# Patient Record
Sex: FEMALE | Race: WHITE | Employment: OTHER | URBAN - METROPOLITAN AREA
[De-identification: names, ages, dates, MRNs, and addresses within clinical notes are randomized per-mention and may not be internally consistent; named-entity substitution may affect disease eponyms.]

---

## 2017-02-01 ENCOUNTER — OFFICE VISIT (OUTPATIENT)
Dept: FAMILY MEDICINE CLINIC | Age: 67
End: 2017-02-01

## 2017-02-01 VITALS
BODY MASS INDEX: 29.09 KG/M2 | HEART RATE: 82 BPM | WEIGHT: 180.25 LBS | OXYGEN SATURATION: 97 % | SYSTOLIC BLOOD PRESSURE: 110 MMHG | RESPIRATION RATE: 12 BRPM | DIASTOLIC BLOOD PRESSURE: 70 MMHG

## 2017-02-01 DIAGNOSIS — J30.9 ALLERGIC RHINITIS, UNSPECIFIED ALLERGIC RHINITIS TRIGGER, UNSPECIFIED RHINITIS SEASONALITY: ICD-10-CM

## 2017-02-01 DIAGNOSIS — M76.61 ACHILLES TENDINITIS OF RIGHT LOWER EXTREMITY: ICD-10-CM

## 2017-02-01 DIAGNOSIS — K21.9 GASTROESOPHAGEAL REFLUX DISEASE WITHOUT ESOPHAGITIS: ICD-10-CM

## 2017-02-01 DIAGNOSIS — I83.893 VARICOSE VEINS OF BOTH LEGS WITH EDEMA: ICD-10-CM

## 2017-02-01 DIAGNOSIS — F41.9 ANXIETY: Primary | ICD-10-CM

## 2017-02-01 DIAGNOSIS — Z23 NEED FOR PNEUMOCOCCAL VACCINATION: ICD-10-CM

## 2017-02-01 PROCEDURE — 99214 OFFICE O/P EST MOD 30 MIN: CPT | Performed by: FAMILY MEDICINE

## 2017-02-01 PROCEDURE — 90732 PPSV23 VACC 2 YRS+ SUBQ/IM: CPT | Performed by: FAMILY MEDICINE

## 2017-02-01 PROCEDURE — G0009 ADMIN PNEUMOCOCCAL VACCINE: HCPCS | Performed by: FAMILY MEDICINE

## 2017-02-01 RX ORDER — OMEPRAZOLE 40 MG/1
CAPSULE, DELAYED RELEASE ORAL
Qty: 90 CAPSULE | Refills: 3 | Status: SHIPPED | OUTPATIENT
Start: 2017-02-01 | End: 2018-02-17 | Stop reason: SDUPTHER

## 2017-02-01 RX ORDER — FLUTICASONE PROPIONATE 50 MCG
2 SPRAY, SUSPENSION (ML) NASAL DAILY
Qty: 1 BOTTLE | Refills: 11 | Status: SHIPPED | OUTPATIENT
Start: 2017-02-01 | End: 2022-04-20 | Stop reason: SDUPTHER

## 2017-11-15 PROBLEM — R07.9 CHEST PAIN: Status: ACTIVE | Noted: 2017-11-15

## 2017-11-15 PROBLEM — R10.11 RUQ PAIN: Status: ACTIVE | Noted: 2017-11-15

## 2017-12-07 ENCOUNTER — HOSPITAL ENCOUNTER (OUTPATIENT)
Dept: WOMENS IMAGING | Age: 67
Discharge: HOME | End: 2017-12-07
Attending: SURGERY | Admitting: SURGERY

## 2017-12-07 DIAGNOSIS — Z12.31 VISIT FOR SCREENING MAMMOGRAM: ICD-10-CM

## 2018-02-17 DIAGNOSIS — K21.9 GASTROESOPHAGEAL REFLUX DISEASE WITHOUT ESOPHAGITIS: ICD-10-CM

## 2018-02-19 RX ORDER — OMEPRAZOLE 40 MG/1
CAPSULE, DELAYED RELEASE ORAL
Qty: 30 CAPSULE | Refills: 0 | Status: SHIPPED | OUTPATIENT
Start: 2018-02-19 | End: 2019-09-25 | Stop reason: SDUPTHER

## 2018-12-13 ENCOUNTER — HOSPITAL ENCOUNTER (OUTPATIENT)
Dept: WOMENS IMAGING | Age: 68
Discharge: HOME | End: 2018-12-13
Payer: MEDICARE

## 2018-12-13 ENCOUNTER — HOSPITAL ENCOUNTER (OUTPATIENT)
Dept: ULTRASOUND IMAGING | Age: 68
Discharge: HOME | End: 2018-12-13
Payer: MEDICARE

## 2018-12-13 DIAGNOSIS — Z12.31 VISIT FOR SCREENING MAMMOGRAM: ICD-10-CM

## 2018-12-13 PROCEDURE — 77067 SCR MAMMO BI INCL CAD: CPT

## 2018-12-13 PROCEDURE — 76642 ULTRASOUND BREAST LIMITED: CPT

## 2019-09-25 ENCOUNTER — OFFICE VISIT (OUTPATIENT)
Dept: FAMILY MEDICINE CLINIC | Age: 69
End: 2019-09-25
Payer: MEDICARE

## 2019-09-25 VITALS
TEMPERATURE: 97.9 F | DIASTOLIC BLOOD PRESSURE: 62 MMHG | WEIGHT: 185 LBS | SYSTOLIC BLOOD PRESSURE: 102 MMHG | OXYGEN SATURATION: 97 % | BODY MASS INDEX: 29.86 KG/M2 | HEART RATE: 72 BPM

## 2019-09-25 DIAGNOSIS — J01.90 ACUTE BACTERIAL SINUSITIS: Primary | ICD-10-CM

## 2019-09-25 DIAGNOSIS — K21.9 GASTROESOPHAGEAL REFLUX DISEASE WITHOUT ESOPHAGITIS: ICD-10-CM

## 2019-09-25 DIAGNOSIS — B96.89 ACUTE BACTERIAL SINUSITIS: Primary | ICD-10-CM

## 2019-09-25 PROCEDURE — 99213 OFFICE O/P EST LOW 20 MIN: CPT | Performed by: FAMILY MEDICINE

## 2019-09-25 RX ORDER — OMEPRAZOLE 40 MG/1
CAPSULE, DELAYED RELEASE ORAL
Qty: 90 CAPSULE | Refills: 1 | Status: SHIPPED | OUTPATIENT
Start: 2019-09-25 | End: 2020-05-01

## 2019-09-25 RX ORDER — AZITHROMYCIN 500 MG/1
500 TABLET, FILM COATED ORAL DAILY
Qty: 5 TABLET | Refills: 0 | Status: SHIPPED | OUTPATIENT
Start: 2019-09-25 | End: 2019-09-30

## 2019-09-25 ASSESSMENT — ENCOUNTER SYMPTOMS
WHEEZING: 1
SORE THROAT: 1
SINUS PAIN: 1
RHINORRHEA: 1
COUGH: 1

## 2019-09-25 NOTE — PROGRESS NOTES
Subjective:      Patient ID: Ramiro Mccray is a 68 y.o. female. CC: Patient presents for acute medical problem-persistent respiratory infection request a refill of omeprazole. . Medical assistant notes reviewed. URI    This is a recurrent problem. Episode onset: for past 2 days. There has been no fever. Associated symptoms include congestion, coughing, headaches, rhinorrhea, sinus pain, a sore throat and wheezing. Associated symptoms comments: Post nasal drip, brown-yellow mucous, sinus pressure. Treatments tried: Bromfed DM, salt water, Chloraseptic spray. Patient was seen by Urgent Care in [place] and was given Augmentin and Bromfed DM for strep throat and sinusitis. Patient states she finished the antibiotic and her symptoms came back. Patient completed antibiotic therapy 5 days ago and still having symptoms. All the symptoms started in the last 2 weeks. Patient also has known GERD typically takes PPI medications. She does request a refill of omeprazole. Review of Systems   HENT: Positive for congestion, rhinorrhea, sinus pain and sore throat. Respiratory: Positive for cough and wheezing. Neurological: Positive for headaches. Allergies   Allergen Reactions    Other      INH, for tb inhalation       Objective:   Physical Exam   Constitutional: She appears well-developed and well-nourished. She is cooperative. No distress. HENT:   Right Ear: Tympanic membrane and ear canal normal.   Left Ear: Tympanic membrane and ear canal normal.   Nose: Mucosal edema and rhinorrhea (purulent) present. Right sinus exhibits frontal sinus tenderness. Right sinus exhibits no maxillary sinus tenderness. Left sinus exhibits frontal sinus tenderness. Left sinus exhibits no maxillary sinus tenderness. Mouth/Throat: Oropharynx is clear and moist and mucous membranes are normal.   Pulmonary/Chest: Effort normal and breath sounds normal.   Lymphadenopathy:     She has cervical adenopathy.    Neurological: She

## 2019-12-19 ENCOUNTER — HOSPITAL ENCOUNTER (OUTPATIENT)
Dept: WOMENS IMAGING | Age: 69
Discharge: HOME | End: 2019-12-19
Payer: MEDICARE

## 2019-12-19 DIAGNOSIS — Z12.39 BREAST CANCER SCREENING: ICD-10-CM

## 2019-12-19 PROCEDURE — 77067 SCR MAMMO BI INCL CAD: CPT

## 2020-05-01 RX ORDER — OMEPRAZOLE 40 MG/1
CAPSULE, DELAYED RELEASE ORAL
Qty: 90 CAPSULE | Refills: 0 | Status: SHIPPED | OUTPATIENT
Start: 2020-05-01 | End: 2020-08-05

## 2020-08-05 RX ORDER — OMEPRAZOLE 40 MG/1
CAPSULE, DELAYED RELEASE ORAL
Qty: 30 CAPSULE | Refills: 0 | Status: SHIPPED | OUTPATIENT
Start: 2020-08-05 | End: 2020-09-04

## 2020-10-07 RX ORDER — OMEPRAZOLE 40 MG/1
CAPSULE, DELAYED RELEASE ORAL
Qty: 30 CAPSULE | Refills: 0 | Status: SHIPPED | OUTPATIENT
Start: 2020-10-07 | End: 2020-11-09

## 2020-10-07 NOTE — TELEPHONE ENCOUNTER
Medication:   Requested Prescriptions     Pending Prescriptions Disp Refills    omeprazole (PRILOSEC) 40 MG delayed release capsule [Pharmacy Med Name: OMEPRAZOLE DR 40 MG CAPSULE] 30 capsule      Sig: TAKE ONE CAPSULE BY MOUTH DAILY          Patient Phone Number: [phone] (home)     Last appt: 9/25/2019   Next appt: Visit date not found    Last OARRS: No flowsheet data found.   PDMP Monitoring:    Last PDMP [facility] as Reviewed [facility]):  Review User Review Instant Review Result          [facility]:   [facility] [address] [facility] [phone] Jaqueline Hurd [phone] [address] [address]  Phone: [phone] Fax: [phone]

## 2020-11-09 RX ORDER — OMEPRAZOLE 40 MG/1
CAPSULE, DELAYED RELEASE ORAL
Qty: 30 CAPSULE | Refills: 0 | Status: SHIPPED | OUTPATIENT
Start: 2020-11-09 | End: 2022-03-22 | Stop reason: SDUPTHER

## 2020-11-09 NOTE — TELEPHONE ENCOUNTER
Medication:   Requested Prescriptions     Pending Prescriptions Disp Refills    omeprazole (PRILOSEC) 40 MG delayed release capsule [Pharmacy Med Name: OMEPRAZOLE DR 40 MG CAPSULE] 30 capsule 0     Sig: TAKE ONE CAPSULE BY MOUTH DAILY          Patient Phone Number: [phone] (home)     Last appt: 9/25/2019   Next appt: Visit date not found    Last OARRS: No flowsheet data found.   PDMP Monitoring:    Last PDMP Ellis Salcedo as Reviewed [facility]):  Review User Review Instant Review Result          Preferred Pharmacy:   [address] [place] [address] [address], [address] [facility] [phone] [facility] [phone] [address] [facility] [address]  Phone: [phone] Fax: [phone]

## 2020-12-09 RX ORDER — OMEPRAZOLE 40 MG/1
CAPSULE, DELAYED RELEASE ORAL
Qty: 30 CAPSULE | Refills: 0 | OUTPATIENT
Start: 2020-12-09

## 2021-01-07 ENCOUNTER — HOSPITAL ENCOUNTER (OUTPATIENT)
Dept: WOMENS IMAGING | Age: 71
Discharge: HOME | End: 2021-01-07
Payer: MEDICARE

## 2021-01-07 DIAGNOSIS — Z12.31 VISIT FOR SCREENING MAMMOGRAM: ICD-10-CM

## 2021-01-07 PROCEDURE — 77067 SCR MAMMO BI INCL CAD: CPT

## 2021-03-05 ENCOUNTER — APPOINTMENT (OUTPATIENT)
Dept: GENERAL RADIOLOGY | Age: 71
End: 2021-03-05
Payer: MEDICARE

## 2021-03-05 ENCOUNTER — HOSPITAL ENCOUNTER (EMERGENCY)
Age: 71
Discharge: HOME | End: 2021-03-05
Payer: MEDICARE

## 2021-03-05 ENCOUNTER — TELEPHONE (OUTPATIENT)
Dept: FAMILY MEDICINE CLINIC | Age: 71
End: 2021-03-05

## 2021-03-05 VITALS
RESPIRATION RATE: 20 BRPM | SYSTOLIC BLOOD PRESSURE: 145 MMHG | DIASTOLIC BLOOD PRESSURE: 86 MMHG | TEMPERATURE: 97.8 F | BODY MASS INDEX: 30.34 KG/M2 | WEIGHT: 188 LBS | OXYGEN SATURATION: 99 % | HEART RATE: 75 BPM

## 2021-03-05 DIAGNOSIS — M25.512 ACUTE PAIN OF LEFT SHOULDER DUE TO TRAUMA: ICD-10-CM

## 2021-03-05 DIAGNOSIS — S01.511A LIP LACERATION, INITIAL ENCOUNTER: ICD-10-CM

## 2021-03-05 DIAGNOSIS — S09.93XA FACIAL TRAUMA, INITIAL ENCOUNTER: Primary | ICD-10-CM

## 2021-03-05 DIAGNOSIS — G89.11 ACUTE PAIN OF LEFT SHOULDER DUE TO TRAUMA: ICD-10-CM

## 2021-03-05 PROCEDURE — 6360000002 HC RX W HCPCS: Performed by: PHYSICIAN ASSISTANT

## 2021-03-05 PROCEDURE — 12013 RPR F/E/E/N/L/M 2.6-5.0 CM: CPT

## 2021-03-05 PROCEDURE — 90471 IMMUNIZATION ADMIN: CPT | Performed by: PHYSICIAN ASSISTANT

## 2021-03-05 PROCEDURE — 73060 X-RAY EXAM OF HUMERUS: CPT

## 2021-03-05 PROCEDURE — 99283 EMERGENCY DEPT VISIT LOW MDM: CPT

## 2021-03-05 PROCEDURE — 6370000000 HC RX 637 (ALT 250 FOR IP): Performed by: PHYSICIAN ASSISTANT

## 2021-03-05 PROCEDURE — 96372 THER/PROPH/DIAG INJ SC/IM: CPT

## 2021-03-05 PROCEDURE — 90715 TDAP VACCINE 7 YRS/> IM: CPT | Performed by: PHYSICIAN ASSISTANT

## 2021-03-05 RX ORDER — AMOXICILLIN 500 MG/1
500 CAPSULE ORAL 3 TIMES DAILY
Qty: 30 CAPSULE | Refills: 0 | Status: SHIPPED | OUTPATIENT
Start: 2021-03-05 | End: 2021-03-15

## 2021-03-05 RX ORDER — NAPROXEN 500 MG/1
500 TABLET ORAL 2 TIMES DAILY PRN
Qty: 20 TABLET | Refills: 0 | Status: SHIPPED | OUTPATIENT
Start: 2021-03-05 | End: 2021-03-12 | Stop reason: SDUPTHER

## 2021-03-05 RX ORDER — NAPROXEN 250 MG/1
500 TABLET ORAL ONCE
Status: COMPLETED | OUTPATIENT
Start: 2021-03-05 | End: 2021-03-05

## 2021-03-05 RX ADMIN — TETANUS TOXOID, REDUCED DIPHTHERIA TOXOID AND ACELLULAR PERTUSSIS VACCINE, ADSORBED 0.5 ML: 5; 2.5; 8; 8; 2.5 SUSPENSION INTRAMUSCULAR at 09:17

## 2021-03-05 RX ADMIN — NAPROXEN 500 MG: 250 TABLET ORAL at 09:18

## 2021-03-05 ASSESSMENT — ENCOUNTER SYMPTOMS
ABDOMINAL PAIN: 0
SHORTNESS OF BREATH: 0
NAUSEA: 0
CHEST TIGHTNESS: 0
DIARRHEA: 0
VOMITING: 0

## 2021-03-05 ASSESSMENT — PAIN SCALES - GENERAL: PAINLEVEL_OUTOF10: 8

## 2021-03-05 NOTE — TELEPHONE ENCOUNTER
----- Message from Wallace Greenwood sent at 3/5/2021 10:07 AM EST -----  Subject: Appointment Request    Reason for Call: Routine ED Follow Up Visit    QUESTIONS  Type of Appointment? Established Patient  Reason for appointment request? Available appointments did not meet   patient need  Additional Information for Provider? Patient needs an appointment to come   in and have stitches in her lip removed on tuesday according to discharge   instructions from [facility]. No appointments available on   tues. Please call patient to schedule with dr. Alyssia Larios or partner. ---------------------------------------------------------------------------  --------------  Cristobal GARCIA  What is the best way for the office to contact you? OK to leave message on   voicemail  Preferred Call Back Phone Number? [phone]  ---------------------------------------------------------------------------  --------------  SCRIPT ANSWERS  Relationship to Patient? Other  Representative Name? Gomez Garcia  Additional information verified (besides Name and Date of Birth)? Address  Appointment reason? Well Care/Follow Ups  Select a Well Care/Follow Ups appointment reason? Adult ED Follow Up   [Emergency Room   Emergency Department]  (Patient requests to see provider urgently. )? No  Do you have any questions for your primary care provider that need to be   answered prior to your appointment? No  Have you been diagnosed with   tested for   or told that you are suspected of having COVID-19 (Coronavirus)? No  Have you had a fever or taken medication to treat a fever within the past   3 days? No  Have you had a cough   shortness of breath or flu-like symptoms within the past 3 days? No  Do you currently have flu-like symptoms including fever or chills   cough   shortness of breath   or difficulty breathing   or new loss of taste or smell? No  (Service Expert  click yes below to proceed with [employer] As Usual   Scheduling)?  Yes

## 2021-03-05 NOTE — ED PROVIDER NOTES
[address] [facility]        Pt Name: Dipesh Mobley  MRN: [id number]  [place] 1950  Date of evaluation: 3/5/2021  Provider: Collin Riggs PA-C  PCP: Uli Klein MD    MIGUEL. I have evaluated this patient. My supervising physician was available for consultation. CHIEF COMPLAINT       Chief Complaint   Patient presents with    Lip Laceration     Pt reports hitting face on fence d/t dog pulling her. Denies LOC. laceration to bottom lip noted. Bleeding controlled. No blood thinner       HISTORY OF PRESENT ILLNESS   (Location, Timing/Onset, Context/Setting, Quality, Duration, Modifying Factors, Severity, Associated Signs and Symptoms)  Note limiting factors. Dipesh Mobley is a 65 y.o. female presents the emergency department after a trauma that occurred this morning. Patient reports that her dog had gotten away from her. She was running towards her when she accidentally hit the left lateral border of her face as well as left arm on the fence. Patient did not blackout or lose consciousness. Unfortunately she did sustain a laceration underneath the left lower lip. Patient states she is also having pain and discomfort in and around the region of her left humerus which is the shoulder that she had previously had a rotator cuff repair. She states she is done nothing yet for the above-mentioned. She is not currently up-to-date with tetanus vaccination. Current level of pain and discomfort over her lip is 8 out of 10 in her arm is 2 out of 10 and with this she presents to the ED for evaluation and treatment with no additional complaints or concerns. Nursing Notes were all reviewed and agreed with or any disagreements were addressed in the HPI. REVIEW OF SYSTEMS    (2-9 systems for level 4, 10 or more for level 5)     Review of Systems   Constitutional: Negative for activity change, chills and fever.    Respiratory: Negative for chest tightness and shortness of breath. Cardiovascular: Negative for chest pain. Gastrointestinal: Negative for abdominal pain, diarrhea, nausea and vomiting. Genitourinary: Negative for dysuria and flank pain. Musculoskeletal: Positive for arthralgias and myalgias. Skin: Positive for wound. Neurological: Negative for numbness and headaches. Positives and Pertinent negatives as per HPI. Except as noted above in the ROS, all other systems were reviewed and negative. PAST MEDICAL HISTORY     Past Medical History:   Diagnosis Date    Benign neoplasm of colon     GERD (gastroesophageal reflux disease)     Osteoporosis          SURGICAL HISTORY     Past Surgical History:   Procedure Laterality Date    SHOULDER SURGERY      SINUS SURGERY      TONSILLECTOMY           CURRENTMEDICATIONS       Previous Medications    FLUTICASONE (FLONASE) 50 MCG/ACT NASAL SPRAY    2 sprays by Nasal route daily    OMEPRAZOLE (PRILOSEC) 40 MG DELAYED RELEASE CAPSULE    TAKE ONE CAPSULE BY MOUTH DAILY    SUMATRIPTAN (IMITREX) 50 MG TABLET    Take 1 tablet by mouth once as needed for Migraine         ALLERGIES     Other    FAMILYHISTORY       Family History   Problem Relation Age of Onset    Heart Disease Mother         heart rhythm    Heart Disease Father         heart rhythm    COPD Father     Cancer Maternal Grandmother         leukemia          SOCIAL HISTORY       Social History     Tobacco Use    Smoking status: Never Smoker    Smokeless tobacco: Never Used   Substance Use Topics    Alcohol use: No     Alcohol/week: 0.0 standard drinks    Drug use: No       SCREENINGS             PHYSICAL EXAM    (up to 7 for level 4, 8 or more for level 5)     ED Triage Vitals [03/05/21 0829]   BP Temp Temp Source Pulse Resp SpO2 Height Weight   (!) 145/86 97.8 °F (36.6 °C) Temporal 75 20 99 % -- 188 lb (85.3 kg)       Physical Exam  Vitals signs and nursing note reviewed.    Constitutional:       General: She is awake. She is not in acute distress. Appearance: Normal appearance. She is well-developed. She is not ill-appearing or diaphoretic. HENT:      Head: Normocephalic. Laceration present. No raccoon eyes or Lindsey's sign. Jaw: There is normal jaw occlusion. Right Ear: Hearing, tympanic membrane, ear canal and external ear normal. No hemotympanum. Left Ear: Hearing, tympanic membrane, ear canal and external ear normal. No hemotympanum. Nose: Nose normal.      Mouth/Throat:      Lips: Pink. Mouth: Mucous membranes are moist. Injury and lacerations present. Dentition: Dental tenderness present. Pharynx: Oropharynx is clear. Comments: Dentition intact. Small 0.5 cm buckle sided laceration over left incisor. Eyes:      General: No scleral icterus. Right eye: No discharge. Left eye: No discharge. Conjunctiva/sclera: Conjunctivae normal.   Neck:      Musculoskeletal: Normal range of motion. Vascular: No JVD. Cardiovascular:      Rate and Rhythm: Normal rate and regular rhythm. Heart sounds: No murmur. No friction rub. No gallop. Pulmonary:      Effort: Pulmonary effort is normal. No accessory muscle usage or respiratory distress. Breath sounds: Normal breath sounds. No wheezing, rhonchi or rales. Abdominal:      General: There is no distension. Palpations: Abdomen is soft. Abdomen is not rigid. There is no mass. Tenderness: There is no abdominal tenderness. There is no guarding or rebound. Musculoskeletal:      Left upper arm: She exhibits tenderness and bony tenderness. She exhibits no swelling, no edema, no deformity and no laceration. Arms:       Comments: Good range of motion although pain inhibited for the glenohumeral joint. Proximal humerus or tenderness to palpation. Elbow hand and wrist unremarkable. Neurovascular integrity intact. Skin:     General: Skin is warm and dry.    Neurological:      Mental Status: She is alert and oriented to person, place, and time. GCS: GCS eye subscore is 4. GCS verbal subscore is 5. GCS motor subscore is 6. Cranial Nerves: No cranial nerve deficit. Sensory: No sensory deficit. Coordination: Coordination normal.   Psychiatric:         Behavior: Behavior normal. Behavior is cooperative. DIAGNOSTIC RESULTS   LABS:    Labs Reviewed - No data to display    All other labs were within normal range or not returned as of this dictation. EKG: All EKG's are interpreted by the Emergency Department Physician in the absence of a cardiologist.  Please see their note for interpretation of EKG. RADIOLOGY:   Non-plain film images such as CT, Ultrasound and MRI are read by the radiologist. Plain radiographic images are visualized and preliminarily interpreted by the ED Provider with the below findings:        Interpretation per the Radiologist below, if available at the time of this note:    XR HUMERUS LEFT (MIN 2 VIEWS)   Final Result   Negative radiographs of the left humerus. Xr Humerus Left (min 2 Views)    Result Date: 3/5/2021  EXAMINATION: TWO XRAY VIEWS OF THE LEFT HUMERUS 3/5/2021 8:44 am COMPARISON: None. HISTORY: ORDERING SYSTEM PROVIDED HISTORY: injury and pain TECHNOLOGIST PROVIDED HISTORY: Reason for exam:->injury and pain Reason for Exam: Lip Laceration (Pt reports hitting face on fence d/t dog pulling her. Denies LOC. laceration to bottom lip noted. Bleeding controlled. No blood thinner). Left shoulder pain. Acuity: Unknown Type of Exam: Unknown FINDINGS: No fracture of the humerus or the adjacent osseous structures. No dislocation     Negative radiographs of the left humerus.            PROCEDURES   Unless otherwise noted below, none     Lac Repair    Date/Time: 3/5/2021 9:19 AM  Performed by: Doug Coughlin PA-C  Authorized by: Doug Coughlin PA-C     Consent:     Consent obtained:  Verbal  Laceration details:     Location:  Face Face location:  Chin    Length (cm):  3  Repair type:     Repair type:  Simple  Treatment:     Area cleansed with:  Hibiclens    Amount of cleaning:  Standard  Skin repair:     Repair method:  Sutures    Suture size:  6-0    Suture material:  Nylon    Suture technique:  Simple interrupted    Number of sutures:  4  Approximation:     Approximation:  Close  Post-procedure details:     Dressing:  Open (no dressing)    Patient tolerance of procedure: Tolerated well, no immediate complications        CRITICAL CARE TIME   N/A    CONSULTS:  None      EMERGENCY DEPARTMENT COURSE and DIFFERENTIAL DIAGNOSIS/MDM:   Vitals:    Vitals:    03/05/21 0829   BP: (!) 145/86   Pulse: 75   Resp: 20   Temp: 97.8 °F (36.6 °C)   TempSrc: Temporal   SpO2: 99%   Weight: 188 lb (85.3 kg)       Patient was given the following medications:  Medications   Tetanus-Diphth-Acell Pertussis (BOOSTRIX) injection 0.5 mL (0.5 mLs Intramuscular Given 3/5/21 9695)   naproxen (NAPROSYN) tablet 500 mg (500 mg Oral Given 3/5/21 7680)         The patient's detailed history of present illness is documented as above. Upon arrival to the emergency department the patient's vital signs are as documented. The patient is noted to be hemodynamically stable and afebrile. Physical examination findings are as above. Radiographs of the patient's left shoulder demonstrate no evidence of acute bony abnormality. Tetanus updated medication dispensed. Laceration repair completed as above. Lengthy discussion was had with the patient in regards the above-mentioned. There is a small buckle sided laceration as well soft tissue swelling. Medications for the home environment follow-up with her primary care physician for suture removal as well as dentist. The patient has been made aware of the signs and symptoms which would necessitate an immediate return to the emergency department and verbalizes an understanding of these signs and symptoms.     I estimate there is low risk for intracranial hemorrhage or edema, subdural or epidural hematoma, cauda equina or central cord syndrome, cord compression, compartment syndrome, tendon or neurovascular injury, pneumothorax, hemothorax, pericardial tamponade, cardiac contusion, thoracic aortic dissection, intra-abdominal injury or perforated bowel, thus I consider the discharge disposition reasonable. I estimate there is low risk for compartment syndrome, deep venous thrombosis, limb-threatening infectious, tendon and/or neurovascular injury and therefore I consider the discharge disposition reasonable. Huy Fung and I have discussed the diagnosis and risks, and we agree with discharging home to follow-up with their primary care provider and/or the referring orthopedist on call. We also discussed returning to the emergency department immediately if new or worsening symptoms occur. We have discussed the symptoms which are most concerning (e.g., changing or worsening pain, numbness, weakness) that necessitate immediate return. FINAL IMPRESSION      1. Facial trauma, initial encounter    2. Lip laceration, initial encounter    3. Acute pain of left shoulder due to trauma          DISPOSITION/PLAN   DISPOSITION Decision To Discharge 03/05/2021 09:17:04 AM      PATIENT REFERREDTO:  Nguyễn Barnett MD  [facility]  [phone]    In 1 week  For suture removal      Make an appointment with your dentist as we discussed.         [facility] Emergency Department  [address] [facility]  [phone]    If symptoms worsen      DISCHARGE MEDICATIONS:  New Prescriptions    AMOXICILLIN (AMOXIL) 500 MG CAPSULE    Take 1 capsule by mouth 3 times daily for 10 days    NAPROXEN (NAPROSYN) 500 MG TABLET    Take 1 tablet by mouth 2 times daily as needed for Pain       DISCONTINUED MEDICATIONS:  Discontinued Medications    No medications on file              (Please note that portions of this note were completed with a voice recognition program.  Efforts were made to edit the dictations but occasionally words are mis-transcribed.)    Nina Loaiza PA-C (electronically signed)           Verenice Brand PA-C  03/05/21 9834

## 2021-03-12 ENCOUNTER — OFFICE VISIT (OUTPATIENT)
Dept: FAMILY MEDICINE CLINIC | Age: 71
End: 2021-03-12
Payer: MEDICARE

## 2021-03-12 VITALS
OXYGEN SATURATION: 96 % | HEART RATE: 72 BPM | BODY MASS INDEX: 30.34 KG/M2 | TEMPERATURE: 97.8 F | WEIGHT: 188 LBS | DIASTOLIC BLOOD PRESSURE: 66 MMHG | SYSTOLIC BLOOD PRESSURE: 120 MMHG

## 2021-03-12 DIAGNOSIS — M70.52 PES ANSERINUS BURSITIS OF LEFT KNEE: ICD-10-CM

## 2021-03-12 DIAGNOSIS — S46.912A STRAIN OF LEFT SHOULDER, INITIAL ENCOUNTER: ICD-10-CM

## 2021-03-12 DIAGNOSIS — S01.81XA FACIAL LACERATION, INITIAL ENCOUNTER: Primary | ICD-10-CM

## 2021-03-12 PROCEDURE — 99214 OFFICE O/P EST MOD 30 MIN: CPT | Performed by: FAMILY MEDICINE

## 2021-03-12 RX ORDER — NAPROXEN 500 MG/1
500 TABLET ORAL 2 TIMES DAILY PRN
Qty: 20 TABLET | Refills: 0 | Status: SHIPPED | OUTPATIENT
Start: 2021-03-12 | End: 2022-03-22 | Stop reason: SDUPTHER

## 2021-03-12 NOTE — PROGRESS NOTES
Subjective:      Patient ID: Hany Maurer is a 66 y.o. female. CC: Patient presents emergency room follow-up    HPI Patient presents for a follow-up from [facility] ED on 3/5/2021. Patient had a laceration to her face after her dog pulled her away while walking the dog and she hit her face on the fence. Patient has stitches on face underneath her lip. Patient had a through and through laceration. The skin was obviously closed with the buccal mucosa was not closed. She has been eating a light diet and today's first day she feels that she can eat more. She is also having tenderness in the left shoulder. She did have a shoulder x-ray performed in the emergency room which was normal.  She has discomfort in the anterior portion of the left shoulder. Worse with rotation. She also has discomfort on the inner aspect of the knee.   She has been using some ice to these areas and taking Naprosyn intermittently    Review of Systems     Patient Active Problem List   Diagnosis    GERD (gastroesophageal reflux disease)    Osteoporosis    Varicose veins of both legs with edema    Meniscus, medial, derangement    Chest pain    RUQ pain       Outpatient Medications Marked as Taking for the 3/12/21 encounter (Office Visit) with Kalani Cm MD   Medication Sig Dispense Refill    amoxicillin (AMOXIL) 500 MG capsule Take 1 capsule by mouth 3 times daily for 10 days 30 capsule 0    naproxen (NAPROSYN) 500 MG tablet Take 1 tablet by mouth 2 times daily as needed for Pain 20 tablet 0    omeprazole (PRILOSEC) 40 MG delayed release capsule TAKE ONE CAPSULE BY MOUTH DAILY 30 capsule 0    fluticasone (FLONASE) 50 MCG/ACT nasal spray 2 sprays by Nasal route daily 1 Bottle 11       Allergies   Allergen Reactions    Other      INH, for tb inhalation       Social History     Tobacco Use    Smoking status: Never Smoker    Smokeless tobacco: Never Used   Substance Use Topics    Alcohol use: No     Alcohol/week: 0.0 standard drinks       /66 (Site: Left Upper Arm, Position: Sitting, Cuff Size: Medium Adult)   Pulse 72   Temp 97.8 °F (36.6 °C)   Wt 188 lb (85.3 kg)   SpO2 96%   BMI 30.34 kg/m²       Objective:   Physical Exam  Vitals signs and nursing note reviewed. Constitutional:       General: She is not in acute distress. Appearance: She is well-developed. Musculoskeletal:      Right shoulder: Normal.      Left shoulder: She exhibits decreased range of motion (Secondary to pain. Flexion extension is full but abduction is only to 60 degrees and internal and external rotation is limited) and tenderness (Rotator cuff insertion site and deltoid insertion site). She exhibits no swelling and no crepitus. Right knee: Normal.      Left knee: She exhibits normal range of motion, no LCL laxity, normal patellar mobility and no MCL laxity. Tenderness (Pes bursa) found. No medial joint line, no lateral joint line, no MCL, no LCL and no patellar tendon tenderness noted. Skin:     General: Skin is warm. Findings: Laceration present. No ecchymosis. Comments: Well-healing laceration of the face just below the outer left lower lip   Neurological:      Mental Status: She is alert. Psychiatric:         Behavior: Behavior is cooperative. Assessment:      James was seen today for follow-up from hospital.    Diagnoses and all orders for this visit:    Facial laceration, initial encounter    Strain of left shoulder, initial encounter    Pes anserinus bursitis of left knee    Other orders  -     naproxen (NAPROSYN) 500 MG tablet; Take 1 tablet by mouth 2 times daily as needed for Pain            Plan:      Sutures were removed from the lip laceration. Recommended that she continue to watch for infections and may resume diet as she tolerates. For the shoulder strain and pes bursitis I recommend she restart Naprosyn twice a day regularly for the next 2 weeks.   Activities as tolerated    RTC as

## 2021-11-17 ENCOUNTER — TELEPHONE (OUTPATIENT)
Dept: FAMILY MEDICINE CLINIC | Age: 71
End: 2021-11-17

## 2021-11-17 NOTE — TELEPHONE ENCOUNTER
Patient needs a referral for her Right big toe nail is dead and needs to be removed.     Please advise

## 2022-02-10 ENCOUNTER — HOSPITAL ENCOUNTER (OUTPATIENT)
Dept: WOMENS IMAGING | Age: 72
Discharge: HOME | End: 2022-02-10
Payer: MEDICARE

## 2022-02-10 DIAGNOSIS — Z12.31 BREAST CANCER SCREENING BY MAMMOGRAM: ICD-10-CM

## 2022-02-10 PROCEDURE — 77063 BREAST TOMOSYNTHESIS BI: CPT

## 2022-03-22 ENCOUNTER — OFFICE VISIT (OUTPATIENT)
Dept: FAMILY MEDICINE CLINIC | Age: 72
End: 2022-03-22
Payer: MEDICARE

## 2022-03-22 VITALS
BODY MASS INDEX: 30.34 KG/M2 | OXYGEN SATURATION: 96 % | DIASTOLIC BLOOD PRESSURE: 80 MMHG | TEMPERATURE: 97.3 F | WEIGHT: 188 LBS | HEART RATE: 81 BPM | SYSTOLIC BLOOD PRESSURE: 110 MMHG

## 2022-03-22 DIAGNOSIS — Z00.00 WELL ADULT EXAM: ICD-10-CM

## 2022-03-22 DIAGNOSIS — Z13.6 SCREENING FOR CARDIOVASCULAR CONDITION: ICD-10-CM

## 2022-03-22 DIAGNOSIS — K21.9 GASTROESOPHAGEAL REFLUX DISEASE WITHOUT ESOPHAGITIS: ICD-10-CM

## 2022-03-22 DIAGNOSIS — R73.9 HYPERGLYCEMIA: ICD-10-CM

## 2022-03-22 DIAGNOSIS — I80.01 SUPERFICIAL PHLEBITIS OF LEG, RIGHT: Primary | ICD-10-CM

## 2022-03-22 PROCEDURE — 99213 OFFICE O/P EST LOW 20 MIN: CPT | Performed by: FAMILY MEDICINE

## 2022-03-22 RX ORDER — NAPROXEN 500 MG/1
500 TABLET ORAL 2 TIMES DAILY PRN
Qty: 20 TABLET | Refills: 0 | Status: SHIPPED | OUTPATIENT
Start: 2022-03-22 | End: 2022-04-20

## 2022-03-22 RX ORDER — OMEPRAZOLE 40 MG/1
CAPSULE, DELAYED RELEASE ORAL
Qty: 30 CAPSULE | Refills: 0 | Status: SHIPPED | OUTPATIENT
Start: 2022-03-22 | End: 2022-04-20 | Stop reason: SDUPTHER

## 2022-03-22 NOTE — PROGRESS NOTES
Subjective:      Patient ID: Pritesh Monsivais is a 74 y.o. female. CC: Patient presents for acute medical problem-inflammation of the right leg. Medical assistant notes reviewed. HPI Patient presents with a painful lump behind her right knee. The area is painful and red. Patient members no particular injury she states the veins in that area very very swollen the day prior. Patient has known varicose veins and was treated 7+ years ago for these varicosities. Patient also request a physical examination as she is not had a checkup for a number of years. She also request a refill of omeprazole for GERD. She is talk about weight issues. Review of Systems     Allergies   Allergen Reactions    Other      INH, for tb inhalation       Objective:   Physical Exam  Vitals and nursing note reviewed. Constitutional:       General: She is not in acute distress. Appearance: She is well-developed. Musculoskeletal:      Right hip: Normal. Normal range of motion. Right upper leg: Deformity present. Right knee: Normal. Normal range of motion. Comments: Superficial phlebitis noted on the inner thigh of approximately 6 cm in length. This does extend somewhat down to the popliteal area. No calf involvement but she has obvious varicosities of both lower extremities right greater than left. Skin:     General: Skin is warm. Findings: Rash present. Neurological:      Mental Status: She is alert. Psychiatric:         Behavior: Behavior is cooperative. Assessment:      Devon Martinez was seen today for mass. Diagnoses and all orders for this visit:    Superficial phlebitis of leg, right    Gastroesophageal reflux disease without esophagitis  -     omeprazole (PRILOSEC) 40 MG delayed release capsule; TAKE ONE CAPSULE BY MOUTH DAILY    Screening for cardiovascular condition  -     Comprehensive Metabolic Panel; Future  -     Lipid Panel;  Future    Hyperglycemia  -     Comprehensive Metabolic Panel; Future  -     Hemoglobin A1C; Future    Well adult exam  -     Comprehensive Metabolic Panel; Future  -     Lipid Panel; Future    Other orders  -     naproxen (NAPROSYN) 500 MG tablet; Take 1 tablet by mouth 2 times daily as needed for Pain            Plan:      Informational handout provided  Recommended moist heat or cool compresses to this area 3 times daily, leg elevation and anti-inflammatory medications. RTC for annual Medicare visit follow-up of chronic health issues. Medical decision making of low complexity      Please note that this chart was generated using Dragon dictation software. Although every effort was made to ensure the accuracy of this automated transcription, some errors in transcription may have occurred.         Medical decision making of low complexity

## 2022-03-22 NOTE — PATIENT INSTRUCTIONS
Patient Education        Varicose Veins: Care Instructions  Your Care Instructions  Varicose veins are twisted, enlarged veins near the surface of the skin. They develop most often in the legs and ankles. Some people may be more likely than others to get varicose veins because of aging or hormone changes or because a parent has them. Being overweight or pregnant can make varicose veins worse. Jobs that require standing for long periods of time also can make them worse. Follow-up care is a key part of your treatment and safety. Be sure to make and go to all appointments, and call your doctor if you are having problems. It's also a good idea to know your test results and keep a list of the medicines you take. How can you care for yourself at home? · Wear compression stockings during the day to help relieve symptoms. They improve blood flow and are the main treatment for varicose veins. Talk to your doctor about which ones to get and where to get them. · Prop up your legs at or above the level of your heart when possible. This helps keep the blood from pooling in your lower legs and improves blood flow to the rest of your body. · Avoid sitting and standing for long periods. This puts added stress on your veins. · Get regular exercise, and control your weight. Walk, bicycle, or swim to improve blood flow in your legs. · If you bump your leg so hard that you know it is likely to bruise, prop up your leg and put ice or a cold pack on the area for 10 to 20 minutes at a time. Try to do this every 1 to 2 hours for the next 3 days (when you are awake) or until the swelling goes down. Put a thin cloth between the ice and your skin. · If you cut or scratch the skin over a vein, it may bleed a lot. Prop up your leg and apply firm pressure with a clean bandage over the site of the bleeding. Continue to apply pressure for a full 15 minutes. Do not check sooner to see if the bleeding has stopped.  If the bleeding has not stopped after 15 minutes, apply pressure again for another 15 minutes. You can repeat this up to 3 times for a total of 45 minutes. If you have a blood clot in a varicose vein, you may have tenderness and swelling over the vein. The vein may feel firm. Be sure to call your doctor right away if you have these symptoms. If your doctor has told you how to care for the clot, follow his or her instructions. Care may include the following:  · Prop up your leg and apply heat with a warm, damp cloth or a heating pad set on low (put a towel or cloth between your leg and the heating pad to prevent burns). · Ask your doctor if you can take an over-the-counter pain medicine, such as acetaminophen (Tylenol), ibuprofen (Advil, Motrin), or naproxen (Aleve). Be safe with medicines. Read and follow all instructions on the label. When should you call for help? Call 911 anytime you think you may need emergency care. For example, call if:    · You have sudden chest pain and shortness of breath, or you cough up blood. Call your doctor now or seek immediate medical care if:    · You have signs of a blood clot, such as:  ? Pain in your calf, back of the knee, thigh, or groin. ? Redness and swelling in your leg or groin.     · A varicose vein begins to bleed and you cannot stop it.     · You have a tender lump in your leg.     · You get an open sore. Watch closely for changes in your health, and be sure to contact your doctor if:    · Your varicose vein symptoms do not improve with home treatment. Where can you learn more? Go to https://Pocket.Crowdcube. org and sign in to your Oxtox account. Enter X980 in the Butlr box to learn more about \"Varicose Veins: Care Instructions. \"     If you do not have an account, please click on the \"Sign Up Now\" link. Current as of: July 6, 2021               Content Version: 13.1  © 8207-5913 Healthwise, [employer].    Care instructions adapted under license by [facility] ([facility]). If you have questions about a medical condition or this instruction, always ask your healthcare professional. [place] [address] any warranty or liability for your use of this information. Patient Education        Superficial Thrombophlebitis: Care Instructions  Your Care Instructions  Superficial thrombophlebitis is inflammation in a vein where a blood clot has formed close to the surface of the skin. You may be able to feel the clot as a firm lump under the skin. The skin over the clot can become red, tender, and warm to the touch. Blood clots in veins close to the skin's surface usually are not serious and often can be treated at home. Sometimes superficial thrombophlebitis spreads to a deeper vein (deep vein thrombosis, or DVT). These deeper clots can be serious, even life-threatening. It is very important that you follow your doctor's instructions, keep all follow-up appointments, and watch for new or worsening symptoms of a clot. Follow-up care is a key part of your treatment and safety. Be sure to make and go to all appointments, and call your doctor if you are having problems. It's also a good idea to know your test results and keep a list of the medicines you take. How can you care for yourself at home? · Take your medicines exactly as prescribed. Call your doctor if you think you are having a problem with your medicine. You will get more details on the specific medicines your doctor prescribes. · Prop up the sore leg or arm on a pillow anytime you sit or lie down. Try to keep it above the level of your heart. To prevent thrombophlebitis  · Exercise. Keep blood moving in your legs to keep new clots from forming. · Get up out of bed as soon as possible after an illness or surgery. · Do not smoke. If you need help quitting, talk to your doctor about stop-smoking programs and medicines. These can increase your chances of quitting for good.   · Ask your doctor about compression stockings. These may help prevent blood clots from forming in your legs. But there are different types of stockings, and they need to fit right. So your doctor will recommend what you need. When should you call for help? Call 911 anytime you think you may need emergency care. For example, call if:    · You have sudden chest pain and shortness of breath, or you cough up blood.     · You vomit blood or what looks like coffee grounds.     · You pass maroon or very bloody stools.     · You passed out (lost consciousness). Call your doctor now or seek immediate medical care if:    · You have signs of a blood clot, such as:  ? Pain in your calf, back of the knee, thigh, or groin. ? Redness and swelling in your leg or groin.     · You notice a new hard, red, or tender area in your leg.     · Your stools are black and tarlike or have streaks of blood. Watch closely for changes in your health, and be sure to contact your doctor if:    · You do not get better as expected. Where can you learn more? Go to https://AlignablepeKidoZen.MobileReactor. org and sign in to your [employer] account. Enter [phone] in the [facility] box to learn more about \"Superficial Thrombophlebitis: Care Instructions. \"     If you do not have an account, please click on the \"Sign Up Now\" link. Current as of: July 6, 2021               Content Version: 13.1  © 2655-7947 [employer], [facility]. Care instructions adapted under license by [facility] ([facility]). If you have questions about a medical condition or this instruction, always ask your healthcare professional. [place] [address] any warranty or liability for your use of this information.

## 2022-04-11 ENCOUNTER — HOSPITAL ENCOUNTER (OUTPATIENT)
Age: 72
Discharge: HOME | End: 2022-04-11
Payer: MEDICARE

## 2022-04-11 DIAGNOSIS — Z00.00 WELL ADULT EXAM: ICD-10-CM

## 2022-04-11 DIAGNOSIS — Z13.6 SCREENING FOR CARDIOVASCULAR CONDITION: ICD-10-CM

## 2022-04-11 DIAGNOSIS — R73.9 HYPERGLYCEMIA: ICD-10-CM

## 2022-04-11 LAB
A/G RATIO: 1.6 (ref 1.1–2.2)
ALBUMIN SERPL-MCNC: 4.4 G/DL (ref 3.4–5)
ALP BLD-CCNC: 67 U/L (ref 40–129)
ALT SERPL-CCNC: 11 U/L (ref 10–40)
ANION GAP SERPL CALCULATED.3IONS-SCNC: 15 MMOL/L (ref 3–16)
AST SERPL-CCNC: 17 U/L (ref 15–37)
BILIRUB SERPL-MCNC: 1.1 MG/DL (ref 0–1)
BUN BLDV-MCNC: 17 MG/DL (ref 7–20)
CALCIUM SERPL-MCNC: 9.7 MG/DL (ref 8.3–10.6)
CHLORIDE BLD-SCNC: 102 MMOL/L (ref 99–110)
CHOLESTEROL, TOTAL: 239 MG/DL (ref 0–199)
CO2: 23 MMOL/L (ref 21–32)
CREAT SERPL-MCNC: 0.7 MG/DL (ref 0.6–1.2)
GFR AFRICAN AMERICAN: >60
GFR NON-AFRICAN AMERICAN: >60
GLUCOSE BLD-MCNC: 101 MG/DL (ref 70–99)
HDLC SERPL-MCNC: 62 MG/DL (ref 40–60)
LDL CHOLESTEROL CALCULATED: 159 MG/DL
POTASSIUM SERPL-SCNC: 4.5 MMOL/L (ref 3.5–5.1)
SODIUM BLD-SCNC: 140 MMOL/L (ref 136–145)
TOTAL PROTEIN: 7.1 G/DL (ref 6.4–8.2)
TRIGL SERPL-MCNC: 88 MG/DL (ref 0–150)
VLDLC SERPL CALC-MCNC: 18 MG/DL

## 2022-04-11 PROCEDURE — 83036 HEMOGLOBIN GLYCOSYLATED A1C: CPT

## 2022-04-11 PROCEDURE — 80053 COMPREHEN METABOLIC PANEL: CPT

## 2022-04-11 PROCEDURE — 36415 COLL VENOUS BLD VENIPUNCTURE: CPT

## 2022-04-11 PROCEDURE — 80061 LIPID PANEL: CPT

## 2022-04-12 LAB
ESTIMATED AVERAGE GLUCOSE: 119.8 MG/DL
HBA1C MFR BLD: 5.8 %

## 2022-04-20 ENCOUNTER — OFFICE VISIT (OUTPATIENT)
Dept: FAMILY MEDICINE CLINIC | Age: 72
End: 2022-04-20
Payer: MEDICARE

## 2022-04-20 VITALS
OXYGEN SATURATION: 98 % | SYSTOLIC BLOOD PRESSURE: 114 MMHG | RESPIRATION RATE: 12 BRPM | HEART RATE: 66 BPM | HEIGHT: 65 IN | BODY MASS INDEX: 31.51 KG/M2 | TEMPERATURE: 97.2 F | DIASTOLIC BLOOD PRESSURE: 70 MMHG | WEIGHT: 189.13 LBS

## 2022-04-20 DIAGNOSIS — Z00.00 MEDICARE ANNUAL WELLNESS VISIT, SUBSEQUENT: Primary | ICD-10-CM

## 2022-04-20 DIAGNOSIS — M17.12 PRIMARY OSTEOARTHRITIS OF LEFT KNEE: ICD-10-CM

## 2022-04-20 DIAGNOSIS — R82.90 BAD ODOR OF URINE: ICD-10-CM

## 2022-04-20 DIAGNOSIS — J30.9 ALLERGIC RHINITIS, UNSPECIFIED SEASONALITY, UNSPECIFIED TRIGGER: ICD-10-CM

## 2022-04-20 DIAGNOSIS — I83.893 VARICOSE VEINS OF BOTH LEGS WITH EDEMA: ICD-10-CM

## 2022-04-20 DIAGNOSIS — K21.9 GASTROESOPHAGEAL REFLUX DISEASE WITHOUT ESOPHAGITIS: ICD-10-CM

## 2022-04-20 PROBLEM — R07.9 CHEST PAIN: Status: RESOLVED | Noted: 2017-11-15 | Resolved: 2022-04-20

## 2022-04-20 PROBLEM — R10.11 RUQ PAIN: Status: RESOLVED | Noted: 2017-11-15 | Resolved: 2022-04-20

## 2022-04-20 LAB
BACTERIA URINE, POC: ABNORMAL
BILIRUBIN URINE: 0 MG/DL
BLOOD, URINE: NEGATIVE
CASTS URINE, POC: 0
CLARITY: ABNORMAL
COLOR: YELLOW
CRYSTALS URINE, POC: 0
EPI CELLS URINE, POC: ABNORMAL
GLUCOSE URINE: ABNORMAL
KETONES, URINE: NEGATIVE
LEUKOCYTE EST, POC: POSITIVE
NITRITE, URINE: POSITIVE
PH UA: 7.5 (ref 4.5–8)
PROTEIN UA: NEGATIVE
RBC URINE, POC: 0
SPECIFIC GRAVITY UA: 1.02 (ref 1–1.03)
UROBILINOGEN, URINE: ABNORMAL
WBC URINE, POC: ABNORMAL
YEAST URINE, POC: 0

## 2022-04-20 PROCEDURE — 99214 OFFICE O/P EST MOD 30 MIN: CPT | Performed by: FAMILY MEDICINE

## 2022-04-20 PROCEDURE — 81000 URINALYSIS NONAUTO W/SCOPE: CPT | Performed by: FAMILY MEDICINE

## 2022-04-20 PROCEDURE — G0439 PPPS, SUBSEQ VISIT: HCPCS | Performed by: FAMILY MEDICINE

## 2022-04-20 RX ORDER — OMEPRAZOLE 40 MG/1
CAPSULE, DELAYED RELEASE ORAL
Qty: 90 CAPSULE | Refills: 3 | Status: SHIPPED | OUTPATIENT
Start: 2022-04-20

## 2022-04-20 RX ORDER — SULFAMETHOXAZOLE AND TRIMETHOPRIM 800; 160 MG/1; MG/1
1 TABLET ORAL 2 TIMES DAILY
Qty: 10 TABLET | Refills: 0 | Status: SHIPPED | OUTPATIENT
Start: 2022-04-20 | End: 2022-04-25

## 2022-04-20 RX ORDER — FLUTICASONE PROPIONATE 50 MCG
2 SPRAY, SUSPENSION (ML) NASAL DAILY
Qty: 3 EACH | Refills: 3 | Status: SHIPPED | OUTPATIENT
Start: 2022-04-20

## 2022-04-20 RX ORDER — SUMATRIPTAN 50 MG/1
50 TABLET, FILM COATED ORAL
Qty: 9 TABLET | Refills: 2 | Status: SHIPPED | OUTPATIENT
Start: 2022-04-20 | End: 2022-10-11

## 2022-04-20 ASSESSMENT — PATIENT HEALTH QUESTIONNAIRE - PHQ9
1. LITTLE INTEREST OR PLEASURE IN DOING THINGS: 0
SUM OF ALL RESPONSES TO PHQ9 QUESTIONS 1 & 2: 0
SUM OF ALL RESPONSES TO PHQ QUESTIONS 1-9: 0
2. FEELING DOWN, DEPRESSED OR HOPELESS: 0
SUM OF ALL RESPONSES TO PHQ QUESTIONS 1-9: 0

## 2022-04-20 ASSESSMENT — LIFESTYLE VARIABLES: HOW OFTEN DO YOU HAVE A DRINK CONTAINING ALCOHOL: NEVER

## 2022-04-20 NOTE — PROGRESS NOTES
Subjective:      Patient ID: Sarah Mixon is a 74 y.o. female. CC: Patient presents for AMV and follow-up of chronic health problems    HPI patient presents for annual Medicare visit and has several health issues. For the last week now she has had issues with urination including a bad smell to the urine. She does have a history of UTIs but has been years ago. She went to review the varicosities of both lower extremities with occasional edema. She has more issues with the left knee. She has headaches pretty much regularly daily but worse on a seasonal basis. She does use Flonase but on an intermittent basis. She also has known GERD which overall is controlled with dietary measures and occasional as needed medications. Patient does request refill of Imitrex to use for headaches. She can discern between the migraine headaches and the chronic almost daily headaches.     Review of Systems  Patient Active Problem List   Diagnosis    GERD (gastroesophageal reflux disease)    Osteoporosis    Varicose veins of both legs with edema    Meniscus, medial, derangement    Chest pain    RUQ pain       Outpatient Medications Marked as Taking for the 4/20/22 encounter (Office Visit) with Karo Flores MD   Medication Sig Dispense Refill    omeprazole (PRILOSEC) 40 MG delayed release capsule TAKE ONE CAPSULE BY MOUTH DAILY 30 capsule 0    SUMAtriptan (IMITREX) 50 MG tablet Take 1 tablet by mouth once as needed for Migraine 9 tablet 0    fluticasone (FLONASE) 50 MCG/ACT nasal spray 2 sprays by Nasal route daily 1 Bottle 11       Allergies   Allergen Reactions    Other      INH, for tb inhalation       Social History     Tobacco Use    Smoking status: Never Smoker    Smokeless tobacco: Never Used   Substance Use Topics    Alcohol use: No     Alcohol/week: 0.0 standard drinks       /70 (Site: Right Upper Arm, Position: Sitting, Cuff Size: Large Adult)   Pulse 66   Temp 97.2 °F (36.2 °C) (Infrared) Resp 12   Ht 5' 4.5\" (1.638 m)   Wt 189 lb 2 oz (85.8 kg)   SpO2 98%   BMI 31.96 kg/m²     Objective:   Physical Exam  Vitals reviewed. Constitutional:       General: She is not in acute distress. Appearance: Normal appearance. She is well-developed. HENT:      Right Ear: Tympanic membrane normal.      Left Ear: Tympanic membrane normal.      Nose: Mucosal edema, congestion and rhinorrhea present. Right Sinus: No maxillary sinus tenderness or frontal sinus tenderness. Left Sinus: No maxillary sinus tenderness or frontal sinus tenderness. Neck:      Vascular: No carotid bruit. Cardiovascular:      Rate and Rhythm: Normal rate and regular rhythm. Pulses:           Dorsalis pedis pulses are 2+ on the right side and 2+ on the left side. Posterior tibial pulses are 2+ on the right side and 2+ on the left side. Heart sounds: Normal heart sounds. No murmur heard. No friction rub. No gallop. Pulmonary:      Effort: Pulmonary effort is normal. No respiratory distress. Breath sounds: Normal breath sounds. Abdominal:      General: Bowel sounds are normal. There is no distension. Palpations: Abdomen is soft. Abdomen is not rigid. There is no hepatomegaly, splenomegaly or mass. Tenderness: There is no abdominal tenderness. There is no right CVA tenderness, left CVA tenderness, guarding or rebound. Hernia: No hernia is present. Musculoskeletal:         General: Normal range of motion. Cervical back: Neck supple. Right knee: Normal.      Left knee: Crepitus present. Normal range of motion. Tenderness present over the medial joint line. No lateral joint line tenderness. Right lower leg: No edema. Left lower leg: No edema. Lymphadenopathy:      Cervical: No cervical adenopathy. Skin:     General: Skin is warm. Findings: No rash.       Comments: Multiple varicosities noted both lower extremities   Neurological:      Mental Status: She is alert and oriented to person, place, and time. Mental status is at baseline. Sensory: Sensation is intact. Motor: Motor function is intact. Psychiatric:         Behavior: Behavior is cooperative. Assessment:      Shonda Qureshi was seen today for medicare awv. Diagnoses and all orders for this visit:    Medicare annual wellness visit, subsequent    Gastroesophageal reflux disease without esophagitis  -     omeprazole (PRILOSEC) 40 MG delayed release capsule; TAKE ONE CAPSULE BY MOUTH DAILY    Bad odor of urine  -     POC URINE with Microscopic  -     Culture, Urine    Varicose veins of both legs with edema    Primary osteoarthritis of left knee    Allergic rhinitis, unspecified seasonality, unspecified trigger    Other orders  -     SUMAtriptan (IMITREX) 50 MG tablet; Take 1 tablet by mouth once as needed for Migraine  -     fluticasone (FLONASE) 50 MCG/ACT nasal spray; 2 sprays by Nasal route daily  -     sulfamethoxazole-trimethoprim (BACTRIM DS) 800-160 MG per tablet; Take 1 tablet by mouth 2 times daily for 5 days            Plan:      Reviewed labs and test results with patient. The chronic daily headaches are obviously related to underlying allergic rhinitis. Recommend patient start using Flonase nasal spray every day in addition to limiting both her headaches. We also discussed she could add over-the-counter Zyrtec or Claritin. Start antibiotic therapy for urinary tract infection    Discussed with the osteoarthritis and knee the water aerobic program or bicycle or walking would be beneficial for her exercise. The varicose veins do not require any additional intervention at this point in time but if she was still having swelling we did discuss support stockings.     For the GERD continue with PPI medication    Patient received counseling on the following healthy behaviors: nutrition and exercise     Patient given educational materials     Health maintenance updated    Discussed use, benefit, and side effects of prescribed medications. Barriers to medication compliance addressed. All patient questions answered. Pt voiced understanding. Patient needs RTC in one year. Medical decision making of moderate complexity. Please note that this chart was generated using Dragon dictation software. Although every effort was made to ensure the accuracy of this automated transcription, some errors in transcription may have occurred.

## 2022-04-20 NOTE — PATIENT INSTRUCTIONS
Personalized Preventive Plan for Oh Mixon - 4/20/2022  Medicare offers a range of preventive health benefits. Some of the tests and screenings are paid in full while other may be subject to a deductible, co-insurance, and/or copay. Some of these benefits include a comprehensive review of your medical history including lifestyle, illnesses that may run in your family, and various assessments and screenings as appropriate. After reviewing your medical record and screening and assessments performed today your provider may have ordered immunizations, labs, imaging, and/or referrals for you. A list of these orders (if applicable) as well as your Preventive Care list are included within your After Visit Summary for your review. Other Preventive Recommendations:    · A preventive eye exam performed by an eye specialist is recommended every 1-2 years to screen for glaucoma; cataracts, macular degeneration, and other eye disorders. · A preventive dental visit is recommended every 6 months. · Try to get at least 150 minutes of exercise per week or 10,000 steps per day on a pedometer . · Order or download the FREE \"Exercise & Physical Activity: Your Everyday Guide\" from [employer]. Call 1-203.595.4122 or search The [employer] online. · You need 6825-8730 mg of calcium and 7083-6490 IU of vitamin D per day. It is possible to meet your calcium requirement with diet alone, but a vitamin D supplement is usually necessary to meet this goal.  · When exposed to the sun, use a sunscreen that protects against both UVA and UVB radiation with an SPF of 30 or greater. Reapply every 2 to 3 hours or after sweating, drying off with a towel, or swimming. · Always wear a seat belt when traveling in a car. Always wear a helmet when riding a bicycle or motorcycle. Personalized Preventive Plan for Oh Mixon - 4/20/2022  Medicare offers a range of preventive health benefits.  Some of the tests and screenings are paid in full while other may be subject to a deductible, co-insurance, and/or copay. Some of these benefits include a comprehensive review of your medical history including lifestyle, illnesses that may run in your family, and various assessments and screenings as appropriate. After reviewing your medical record and screening and assessments performed today your provider may have ordered immunizations, labs, imaging, and/or referrals for you. A list of these orders (if applicable) as well as your Preventive Care list are included within your After Visit Summary for your review. Other Preventive Recommendations:    A preventive eye exam performed by an eye specialist is recommended every 1-2 years to screen for glaucoma; cataracts, macular degeneration, and other eye disorders. A preventive dental visit is recommended every 6 months. Try to get at least 150 minutes of exercise per week or 10,000 steps per day on a pedometer . Order or download the FREE \"Exercise & Physical Activity: Your Everyday Guide\" from The [employer]. Call 3-627.955.7449 or search The [employer] online. You need 6491-9079 mg of calcium and 6417-1278 IU of vitamin D per day. It is possible to meet your calcium requirement with diet alone, but a vitamin D supplement is usually necessary to meet this goal.  When exposed to the sun, use a sunscreen that protects against both UVA and UVB radiation with an SPF of 30 or greater. Reapply every 2 to 3 hours or after sweating, drying off with a towel, or swimming. Always wear a seat belt when traveling in a car. Always wear a helmet when riding a bicycle or motorcycle. Patient Education        Learning About High Cholesterol  What is high cholesterol? High cholesterol means that you have too much cholesterol in your blood. Cholesterol is a type of fat. It's needed for many body functions, such as making new cells. Cholesterol is made by your body. It also comes from food youeat. Having high cholesterol can lead to the buildup of plaque in artery walls. Thiscan increase your risk of heart attack and stroke. When your doctor talks about high cholesterol levels, your doctor is talking about your total cholesterol and LDL cholesterol (the \"bad\" cholesterol) levels. Your doctor may also speak about HDL (the \"good\" cholesterol) levels. High HDL is linked with a lower risk for coronary artery disease, heart attack,and stroke. Your cholesterol levels help your doctor find out your risk for having a heartattack or stroke. How can you help prevent high cholesterol? A heart-healthy lifestyle can help you prevent high cholesterol and lower yourrisk for a heart attack and stroke. Eat heart-healthy foods. Eat fruits, vegetables, whole grains, beans, and other high-fiber foods. Eat lean proteins, such as seafood, lean meats, beans, nuts, and soy products. Eat healthy fats, such as canola and olive oil. Choose foods that are low in saturated fat. Limit sodium and alcohol. Limit drinks and foods with added sugar. Be active. Try to do moderate activity at least 2½ hours a week. Or try vigorous activity at least 1¼ hours a week. You may want to walk or try other activities, such as running, swimming, cycling, or playing tennis or team sports. Stay at a healthy weight. Lose weight if you need to. Don't smoke. If you need help quitting, talk to your doctor about stop-smoking programs and medicines. These can increase your chances of quitting for good. How is high cholesterol treated? The goal of treatment is to reduce your chances of having a heart attack orstroke. The goal is not to lower your cholesterol numbers only. Have a heart-healthy lifestyle. This includes eating healthy foods, not smoking, losing weight, and being more active. You may choose to take medicine. Follow-up care is a key part of your treatment and safety. Be sure to make and go to all appointments, and call your doctor if you are having problems. It's also a good idea to know your test results and keep alist of the medicines you take. Where can you learn more? Go to https://chpealexis.JournalDoc. org and sign in to your [employer] account. Enter O869 in the Radario box to learn more about \"Learning About High Cholesterol. \"     If you do not have an account, please click on the \"Sign Up Now\" link. Current as of: January 10, 2022               Content Version: 13.2  © 5738-7983 Healthwise, [employer]. Care instructions adapted under license by [facility] ([facility]). If you have questions about a medical condition or this instruction, always ask your healthcare professional. [address] any warranty or liability for your use of this information.

## 2022-04-20 NOTE — PROGRESS NOTES
Exercise per Session: 30 min     Have you lost any weight without trying in the past 3 months?: No  Body mass index: (!) 31.96  Have you seen the dentist within the past year?: Yes    Health Habits/Nutrition Interventions:  · Inadequate physical activity:  patient agrees to exercise for at least 150 minutes/week    Hearing/Vision:  Do you or your family notice any trouble with your hearing that hasn't been managed with hearing aids?: No  Do you have difficulty driving, watching TV, or doing any of your daily activities because of your eyesight?: No  Have you had an eye exam within the past year?: (!) No  No exam data present    Hearing/Vision Interventions:  · Vision concerns:  patient encouraged to make appointment with his/her eye specialist            Objective   Vitals:    04/20/22 0955   BP: 114/70   Site: Right Upper Arm   Position: Sitting   Cuff Size: Large Adult   Pulse: 66   Resp: 12   Temp: 97.2 °F (36.2 °C)   TempSrc: Infrared   SpO2: 98%   Weight: 189 lb 2 oz (85.8 kg)   Height: 5' 4.5\" (1.638 m)      Body mass index is 31.96 kg/m². Allergies   Allergen Reactions    Other      INH, for tb inhalation     Prior to Visit Medications    Medication Sig Taking?  Authorizing Provider   omeprazole (PRILOSEC) 40 MG delayed release capsule TAKE ONE CAPSULE BY MOUTH DAILY Yes Michaela Mauro MD   SUMAtriptan (IMITREX) 50 MG tablet Take 1 tablet by mouth once as needed for Migraine Yes Joseph Patterson MD   fluticasone (FLONASE) 50 MCG/ACT nasal spray 2 sprays by Nasal route daily Yes Michaela Mauro MD       [facility] (Including outside providers/suppliers regularly involved in providing care):   Patient Care Team:  Michaela Mauro MD as PCP - General (Family Medicine)  Michaela Mauro MD as PCP - [facility] Empaneled Provider  Cosme Cummins MD    Reviewed and updated this visit:  Tobacco  Allergies  Meds  Med Hx  Surg Hx  Soc Hx  Fam Hx

## 2022-04-22 LAB
ORGANISM: ABNORMAL
URINE CULTURE, ROUTINE: ABNORMAL

## 2022-04-22 RX ORDER — CIPROFLOXACIN 500 MG/1
500 TABLET, FILM COATED ORAL 2 TIMES DAILY
Qty: 10 TABLET | Refills: 0 | Status: SHIPPED | OUTPATIENT
Start: 2022-04-22 | End: 2022-04-27

## 2022-04-22 NOTE — TELEPHONE ENCOUNTER
----- Message from Ana Madrid MD sent at 4/22/2022  8:13 AM EDT -----  Shows UTI  Not sens to abx she is on  Change to cipro 500mg BID x 5 days

## 2022-06-02 ENCOUNTER — OFFICE VISIT (OUTPATIENT)
Dept: ENT CLINIC | Age: 72
End: 2022-06-02
Payer: MEDICARE

## 2022-06-02 VITALS — HEART RATE: 87 BPM | SYSTOLIC BLOOD PRESSURE: 123 MMHG | TEMPERATURE: 97.1 F | DIASTOLIC BLOOD PRESSURE: 78 MMHG

## 2022-06-02 DIAGNOSIS — H93.13 TINNITUS OF BOTH EARS: ICD-10-CM

## 2022-06-02 DIAGNOSIS — Z98.890 HX OF SINUS SURGERY: ICD-10-CM

## 2022-06-02 DIAGNOSIS — K21.9 GASTROESOPHAGEAL REFLUX DISEASE, UNSPECIFIED WHETHER ESOPHAGITIS PRESENT: ICD-10-CM

## 2022-06-02 DIAGNOSIS — R51.9 FACIAL PAIN: Primary | ICD-10-CM

## 2022-06-02 DIAGNOSIS — H91.90 PERCEIVED HEARING CHANGES: ICD-10-CM

## 2022-06-02 PROCEDURE — 99203 OFFICE O/P NEW LOW 30 MIN: CPT | Performed by: STUDENT IN AN ORGANIZED HEALTH CARE EDUCATION/TRAINING PROGRAM

## 2022-06-02 PROCEDURE — 1123F ACP DISCUSS/DSCN MKR DOCD: CPT | Performed by: STUDENT IN AN ORGANIZED HEALTH CARE EDUCATION/TRAINING PROGRAM

## 2022-06-02 NOTE — PROGRESS NOTES
[address] [facility] SURGERY  NEW PATIENT HISTORY AND PHYSICAL NOTE      Patient Name: Henny Linda  Medical Record Number:  [id number]  Primary Care Physician:  Carlos Barnes MD    ChiefComplaint     Chief Complaint   Patient presents with    Ear Problem     popping and cracking realy bad ear pain       History of Present Illness     Henny Linda is an 74 y.o. female presenting with left-sided ear and facial pain. This has been going on for a few months. It will last a few days and notices popping and cracking on her ears, worse on the left. Occasionally left ear will feel \"stuffed up\" and \"fluid filled\". No tinnitus currently. No otorrhea. No history of chronic ear infections. No history of otologic surgery. No family history of early onset hearing loss. No loud noise exposures. Denies vertigo. Had chronic balance issues. Chewing gum more recently. Denies nocturnal bruxism. Mother had a history of Ménière's disease. Three sinus surgeries total, last sinus surgery 35 years ago. Had a spot in throat that was \"cut out\" the same time. No mucopurulent nasal drainage. No nasal congestion. Using Flonase daily which helps nasal congestion. No known environmental allergies. No other allergy meds. In the past she would get frequent headaches where she would get light and sound sensitivity. She would take Excedrin Migraine which seem to help. Recently she has been taking her Excedrin Migraine which also seems to help now. Never been formally diagnosed with or treated for migraines. Brother  from esophageal cancer. Takes omeprazole. No dysphagia. Sometimes coughs when she eats.      Past Medical History     Past Medical History:   Diagnosis Date    Benign neoplasm of colon     GERD (gastroesophageal reflux disease)     Osteoporosis        Past Surgical History     Past Surgical History:   Procedure Laterality Date    SHOULDER SURGERY Left     SINUS SURGERY      TONSILLECTOMY         Family History     Family History   Problem Relation Age of Onset    Heart Disease Mother         heart rhythm    Heart Disease Father         heart rhythm    COPD Father     Esophageal Cancer Brother     Cancer Maternal Grandmother         leukemia       Social History     Social History     Tobacco Use    Smoking status: Never Smoker    Smokeless tobacco: Never Used   Substance Use Topics    Alcohol use: No     Alcohol/week: 0.0 standard drinks    Drug use: No        Allergies     Allergies   Allergen Reactions    Other      INH, for tb inhalation       Medications     Current Outpatient Medications   Medication Sig Dispense Refill    omeprazole (PRILOSEC) 40 MG delayed release capsule TAKE ONE CAPSULE BY MOUTH DAILY 90 capsule 3    fluticasone (FLONASE) 50 MCG/ACT nasal spray 2 sprays by Nasal route daily 3 each 3    SUMAtriptan (IMITREX) 50 MG tablet Take 1 tablet by mouth once as needed for Migraine 9 tablet 2     No current facility-administered medications for this visit. Review of Systems     REVIEW OF SYSTEMS    See HPI Above    PhysicalExam     Vitals:    06/02/22 1419   BP: 123/78   Site: Left Upper Arm   Position: Sitting   Cuff Size: Large Adult   Pulse: 87   Temp: 97.1 °F (36.2 °C)   TempSrc: Temporal       PHYSICAL EXAM  /78 (Site: Left Upper Arm, Position: Sitting, Cuff Size: Large Adult)   Pulse 87   Temp 97.1 °F (36.2 °C) (Temporal)     GENERAL: No acute distress, alert and oriented  EYES: EOMI, Anti-icteric  NOSE: On anterior rhinoscopy there is no epistaxis, nasal mucosa moist and normal appearing, no purulent drainage. EARS: Normal external appearance; on portable otomicroscopy:     -Ad: External auditory canal with cerumen impaction that was removed with instrumentation uneventfully, tympanic membrane intact without evidence of retraction or effusion.      -As: External auditory canal without stenosis, tympanic membrane clear, no middle ear effusions or retractions. Pneumatic otoscopy: Bilateral tympanic membranes mobile pneumatic otoscopy  FACE: HB 1/6 bilaterally, symmetric appearing, sensation equal bilaterally  ORAL CAVITY: No masses or lesions visualized or palpated, uvula is midline, moist mucous membranes, absent tonsils  NECK: Mild tenderness palpation of bilateral TMJs upon opening closing jaw. Normal range of motion, no thyromegaly, trachea is midline, no palpable lymphadenopathy or neck masses, no crepitus  NEURO: Cranial Nerves 2, 3, 4, 5, 6, 7, 11, 12 grossly intact bilaterally     I have performed a head and neck physical exam personally or was physically present during the key or critical portions of the service. Assessment and Plan     1. Facial pain  - Sinusitis versus migraines versus TMJ arthralgia. Will further evaluate with CT scan sinus. In the meantime she will stop chewing gum to see if this helps in the case of possible TMJ arthralgia. She will follow-up after CT scan to review imaging. 2. Hx of sinus surgery  - CT SINUS FOR IMAGE GUIDANCE; Future    3. Perceived hearing changes  4. Tinnitus of both ears  -We will obtain comprehensive audiological evaluation at follow-up  - Audiometry with tympanometry; Future    5. Gastroesophageal reflux disease, unspecified whether esophagitis present  -Continue PPI    Follow Up     Return for after imaging, audiogram prior to appointment. Dr. Alberto Dunn [place] [id number]  Department of Otolaryngology/Head & Neck Surgery  6/2/22    Medical Decision Making: The following items were considered in medical decision making:  Independent review of images  Review / order clinical lab tests  Review / order radiology tests  Decision to obtain old records    This note was generated completely or in part utilizing Dragon dictation speech recognition software.   Occasionally, words are mistranscribed and despite editing, the text may contain inaccuracies due to incorrect word recognition. If further clarification is needed please contact the office at [phone].

## 2022-06-10 ENCOUNTER — HOSPITAL ENCOUNTER (OUTPATIENT)
Dept: CT IMAGING | Age: 72
Discharge: HOME | End: 2022-06-10
Payer: MEDICARE

## 2022-06-10 DIAGNOSIS — Z98.890 HX OF SINUS SURGERY: ICD-10-CM

## 2022-06-10 DIAGNOSIS — R51.9 FACIAL PAIN: ICD-10-CM

## 2022-06-10 PROCEDURE — 70486 CT MAXILLOFACIAL W/O DYE: CPT

## 2022-07-12 ENCOUNTER — OFFICE VISIT (OUTPATIENT)
Dept: AUDIOLOGY | Age: 72
End: 2022-07-12
Payer: MEDICARE

## 2022-07-12 ENCOUNTER — OFFICE VISIT (OUTPATIENT)
Dept: ENT CLINIC | Age: 72
End: 2022-07-12
Payer: MEDICARE

## 2022-07-12 VITALS — TEMPERATURE: 97.1 F | DIASTOLIC BLOOD PRESSURE: 78 MMHG | HEART RATE: 81 BPM | SYSTOLIC BLOOD PRESSURE: 128 MMHG

## 2022-07-12 DIAGNOSIS — H74.8X2 TYPE B TYMPANOGRAM, LEFT: Primary | ICD-10-CM

## 2022-07-12 DIAGNOSIS — H90.12 CONDUCTIVE HEARING LOSS OF LEFT EAR WITH UNRESTRICTED HEARING OF RIGHT EAR: ICD-10-CM

## 2022-07-12 DIAGNOSIS — Z98.890 HX OF SINUS SURGERY: ICD-10-CM

## 2022-07-12 DIAGNOSIS — H65.22 CHRONIC SEROUS OTITIS MEDIA, LEFT EAR: Primary | ICD-10-CM

## 2022-07-12 DIAGNOSIS — H90.A12 CONDUCTIVE HEARING LOSS OF LEFT EAR WITH RESTRICTED HEARING OF RIGHT EAR: ICD-10-CM

## 2022-07-12 PROCEDURE — 92567 TYMPANOMETRY: CPT | Performed by: AUDIOLOGIST

## 2022-07-12 PROCEDURE — 92557 COMPREHENSIVE HEARING TEST: CPT | Performed by: AUDIOLOGIST

## 2022-07-12 PROCEDURE — 69433 CREATE EARDRUM OPENING: CPT | Performed by: STUDENT IN AN ORGANIZED HEALTH CARE EDUCATION/TRAINING PROGRAM

## 2022-07-12 PROCEDURE — 1123F ACP DISCUSS/DSCN MKR DOCD: CPT | Performed by: STUDENT IN AN ORGANIZED HEALTH CARE EDUCATION/TRAINING PROGRAM

## 2022-07-12 PROCEDURE — 99214 OFFICE O/P EST MOD 30 MIN: CPT | Performed by: STUDENT IN AN ORGANIZED HEALTH CARE EDUCATION/TRAINING PROGRAM

## 2022-07-12 RX ORDER — CIPROFLOXACIN AND DEXAMETHASONE 3; 1 MG/ML; MG/ML
4 SUSPENSION/ DROPS AURICULAR (OTIC) 2 TIMES DAILY
Qty: 1 EACH | Refills: 0 | Status: SHIPPED | OUTPATIENT
Start: 2022-07-12 | End: 2022-07-19

## 2022-07-12 NOTE — PROGRESS NOTES
[facility]  Division of Audiology/Otolaryngology    7/12/2022     Patient name: Nathen Waterman  Primary Care Physician: Shweta Marin MD   Medical Record Number: [id number]     Nathen Waterman   1950, 74 y.o. female   [id number]       Referring Provider: Felix Dunn DO  Referral Type: In an order in [facility]    Reason for Visit: Evaluation of the cause of disorders of hearing, tinnitus, or balance. ADULT AUDIOLOGIC EVALUATION                    Nathen Waterman is a 74 y.o. female seen today, 7/12/2022 , for a same-day audiologic evaluation. Patient was seen by Felix Dunn DO following today's evaluation. AUDIOLOGIC AND OTHER PERTINENT MEDICAL HISTORY:      Nathen Waterman noted diminished hearing from left ear and otalgia. Has noticed frequent crackling/popping. Has migraine history. She notes non-bothersome tinnitus on occasions. Not other otologic factors noted. IMPRESSIONS:      Today's results are consistent with left asymmetrical conductive hearing loss with excellent speech recognition, and evidence of middle ear dysfunction. Moderate low frequency sensorineural loss that rises to normal, in right ear with excellent speech recognition and normall middle ear function. Hearing loss is significant enough to result in difficulty understanding speech in most listening environments. Patient to follow medical recommendations per  Felix Dunn DO.    ASSESSMENT AND FINDINGS:     Otoscopy revealed: Visible tympanic membrane bilaterally    Reliability: Good   Transducer: Inserts    RIGHT EAR:  Hearing Sensitivity: Moderate low frequency sensorineural  hearing loss rising to normal.  Speech Recognition Threshold: 25 dB HL  Word Recognition: Excellent (%), based on NU-6   Tympanometry: Normal peak pressure and compliance, Type A tympanogram, consistent with normal middle ear function.   Acoustic Reflexes: Ipsilateral: Present at normal sensation levels and Present at elevated sensation levels at isolated frequencies. LEFT EAR:  Hearing Sensitivity: Moderate conductive asymmetrical hearing loss; 15-40 dB air bone gaps  Speech Recognition Threshold: 40 dB HL  Word Recognition: Excellent (%), based on NU-6   Tympanometry: Type B/C tympanogram; flat tympanogram with negative peak pressure  Acoustic Reflexes: Ipsilateral: Absent             COUNSELING:      Reviewed purpose of testing completed today, general auditory system function, and results obtained today. The following items are recommended based on patient report and results from today's appointment:   - Continue medical follow-up with Oma Aguilar DO.   - Retest hearing as medically indicated and/or sooner if a change in hearing is noted. Chart CC'd to: Oma Aguilar DO      Degree of   Hearing Sensitivity dB Range   Within Normal Limits (WNL) 0 - 20   Mild 20 - 40   Moderate 40 - 55   Moderately-Severe 55 - 70   Severe 70 - 90   Profound 90 +        RECOMMENDATIONS:        Oma Aguilar DO to medically advise.     Reyes Franco, AuD  Audiologist    Electronically signed by Reyes Franco, AuD on 7/12/22 at 9:40 AM.

## 2022-07-12 NOTE — PROGRESS NOTES
[address] VISIT      Patient Name: Sandra Hernandes  Medical Record Number:  [id number]  Primary Care Physician:  Wiley Frank MD    ChiefComplaint     Chief Complaint   Patient presents with    Hearing Problem     review hearing eval,  pain in ear, popping cracking, mostly Lt side       History of Present Illness     Sandra Hernandes is an 74 y.o. female previously seen for facial pain, history of sinus surgery, perceived hearing changes, bilateral tinnitus, GERD. Interval History:   Ear feels full with some minor dull pain, feels like it needs to pop. Has stopped chewing gum since her last visit. No otorrhea.     Past Medical History     Past Medical History:   Diagnosis Date    Benign neoplasm of colon     GERD (gastroesophageal reflux disease)     Osteoporosis        Past Surgical History     Past Surgical History:   Procedure Laterality Date    SHOULDER SURGERY Left     SINUS SURGERY      TONSILLECTOMY         Family History     Family History   Problem Relation Age of Onset    Heart Disease Mother         heart rhythm    Heart Disease Father         heart rhythm    COPD Father     Esophageal Cancer Brother     Cancer Maternal Grandmother         leukemia       Social History     Social History     Tobacco Use    Smoking status: Never Smoker    Smokeless tobacco: Never Used   Substance Use Topics    Alcohol use: No     Alcohol/week: 0.0 standard drinks    Drug use: No        Allergies     Allergies   Allergen Reactions    Other      INH, for tb inhalation       Medications     Current Outpatient Medications   Medication Sig Dispense Refill    ciprofloxacin-dexamethasone (CIPRODEX) 0.3-0.1 % otic suspension Place 4 drops into the left ear 2 times daily for 7 days 1 each 0    omeprazole (PRILOSEC) 40 MG delayed release capsule TAKE ONE CAPSULE BY MOUTH DAILY 90 capsule 3    SUMAtriptan (IMITREX) 50 MG tablet Take 1 tablet by mouth once as needed for Migraine 9 tablet 2    fluticasone (FLONASE) 50 MCG/ACT nasal spray 2 sprays by Nasal route daily 3 each 3     No current facility-administered medications for this visit. Review of Systems     REVIEW OF SYSTEM: See HPI above    PhysicalExam     Vitals:    07/12/22 0945   BP: 128/78   Site: Left Upper Arm   Position: Sitting   Cuff Size: Medium Adult   Pulse: 81   Temp: 97.1 °F (36.2 °C)   TempSrc: Temporal       PHYSICAL EXAM  /78 (Site: Left Upper Arm, Position: Sitting, Cuff Size: Medium Adult)   Pulse 81   Temp 97.1 °F (36.2 °C) (Temporal)     GENERAL: No acute distress, alert and oriented. EYES: EOMI, Anti-icteric. NOSE: On anterior rhinoscopy there is no epistaxis, nasal mucosa moist and normal appearing, no purulent drainage. EARS: Normal external appearance; on portable otomicroscopy:     -Ad: External auditory canal without stenosis, tympanic membrane clear, no middle ear effusions or retractions     -As: External auditory canal without stenosis, tympanic membrane intact with middle ear effusion, TM immobile on pneumatic otoscopy. FACE: HB 1/6 bilaterally, symmetric appearing, sensation equal bilaterally  ORAL CAVITY: No masses or lesions visualized or palpated, uvula is midline, moist mucous membranes, no oropharyngeal masses or oropharyngeal obstruction. Absent tonsils with scarring of tonsillar fossa. NECK: Normal range of motion, no thyromegaly, trachea is midline, no palpable lymphadenopathy or neck masses, no crepitus  CHEST: Normal respiratory effort, breathing comfortably, no retractions  SKIN: No rashes, normal appearing skin, no evidence of skin lesions/tumors  NEURO: Cranial Nerves 2, 3, 4, 5, 6, 7, 11, 12 grossly intact bilaterally     I have performed a head and neck physical exam personally or was physically present during the key or critical portions of the service.     Procedure     Procedure: Myringotomy with placement of left-sided pressure equalization tube (CPT B8591953)    Pre op Dx: Left-sided chronic serous otitis media   Post Op: Same  Procedure: left-sided myringotomy with tympanostomy tube placement  Consent: Written obtained  Surgeon: Dr. Rehana Akhtar    Description of procedure:    Consent was obtained after discussion concerning the risk, benefits, and alternatives of the procedure were discussed with the patient. Patient was placed in the reclined position the procedure chair. With the use of an operating microscopy and an otologic speculum, the left external auditory canal was examined. Any cerumen was removed as necessary using instrumentation. The tympanic membranes was visualize , revealing intact dull appearing tympanic membrane with middle ear effusion. Topical phenol was applied to the anterior-inferior quadrant of left-sided tympanic membrane. A myringotomy knife was used to make a radial incision in the tympanic membrane. A 3-suction was used to suction out fluid from the middle ear space. An alligator forceps was used to place a Paparella type PE tube in the myringotomy, followed by 4 drops of Ciprodex otic solution. A cottonball was placed in the distal aspect of the ear canal.  The patient tolerated the procedure well. There were no complications with the procedure. Data/Imaging Review     Comprehensive audiological evaluation performed in the office today by Catrachito MELENDEZ was independently reviewed by myself which demonstrates:    As: Mild to moderate conductive hearing loss. Bone-conduction thresholds commensurate with contralateral side. Ad: Mild low-frequency sensorineural hearing loss rising to hearing within normal limits    % right, % left. Type A tympanogram right. Type A tympanogram left.        EXAMINATION:   CT OF THE SINUS WITHOUT CONTRAST 6/10/2022 11:42 am       TECHNIQUE:   CT of the sinuses was performed without the administration of intravenous   contrast. Multiplanar reformatted images are provided for review. Automated   exposure control, iterative reconstruction, and/or weight based adjustment of   the mA/kV was utilized to reduce the radiation dose to as low as reasonably   achievable.       COMPARISON:   07/30/2016 CT       HISTORY:   ORDERING SYSTEM PROVIDED HISTORY: Facial pain   TECHNOLOGIST PROVIDED HISTORY:   Reason for Exam: Facial pain ; Hx of sinus surgery , left ear pain       FINDINGS:   LEFT OMC: The left ostiomeatal complex and frontal recess are patent. Left   maxillary, anterior ethmoid, and frontal sinuses are clear.  Prior surgical   change in the medial wall of the left maxillary sinus.       RIGHT OMC: The right ostiomeatal complex and frontal recess are patent. Right   maxillary, anterior ethmoid, and frontal sinuses are clear.       SPHENOETHMOID: The sphenoethmoidal recesses are patent, and the bilateral   posterior ethmoid and sphenoid sinuses are clear. Sphenoid pneumatization   does not extend into the anterior clinoid processes. The optic nerve and   carotid canals are not dehiscent.  The ethmoid roofs are symmetric       NASAL CAVITY: Nasal cavity is clear. Turbinate anatomy is conventional.       OTHER: The mastoid air cells are well aerated.           Impression   No evidence of sinusitis.         Is from CT sinus without contrast performed 6/10/2022 independently reviewed by myself which demonstrates well aerated paranasal sinuses. There is a patent antral window inferior to the left middle turbinate. There is some hyper pneumatization and sclerosis of the bilateral mastoid air cells with some effusion within the left mastoid air cells. Bilateral middle ear spaces appear aerated although temporal bone exam is limited secondary to slicing. Assessment and Plan     1. Chronic serous otitis media, left ear  2. Conductive hearing loss of left ear with unrestricted hearing of right ear  3.   History of sinus surgery    Plan:  -CT without evidence of sinusitis but with opacification left mastoid air cells. Exam and audiological evaluation consistent with left middle ear effusion. Left PE tube placed today under local anesthesia with noted effusion. Hopefully this resolves most of her issues. Ciprodex x1 week to left ear. She will follow-up in 3 months for reevaluation. Follow-Up     Return in about 3 months (around 10/12/2022). Dr. Charlotte Vinson [place] [id number]  Department of Otolaryngology/Head and Neck Surgery  7/12/22    Medical Decision Making: The following items were considered in medical decision making:  Independent review of images  Review / order clinical lab tests  Review / order radiology tests  Decision to obtain old records      This note was generated completely or in part utilizing Dragon dictation speech recognition software. Occasionally, words are mistranscribed and despite editing, the text may contain inaccuracies due to incorrect word recognition. If further clarification is needed please contact the office at [phone].

## 2022-10-11 ENCOUNTER — OFFICE VISIT (OUTPATIENT)
Dept: ENT CLINIC | Age: 72
End: 2022-10-11
Payer: MEDICARE

## 2022-10-11 VITALS — SYSTOLIC BLOOD PRESSURE: 107 MMHG | DIASTOLIC BLOOD PRESSURE: 71 MMHG | HEART RATE: 76 BPM | TEMPERATURE: 97.3 F

## 2022-10-11 DIAGNOSIS — K21.9 GASTROESOPHAGEAL REFLUX DISEASE, UNSPECIFIED WHETHER ESOPHAGITIS PRESENT: ICD-10-CM

## 2022-10-11 DIAGNOSIS — Z45.89 TYMPANOSTOMY TUBE CHECK: Primary | ICD-10-CM

## 2022-10-11 PROCEDURE — 1123F ACP DISCUSS/DSCN MKR DOCD: CPT | Performed by: STUDENT IN AN ORGANIZED HEALTH CARE EDUCATION/TRAINING PROGRAM

## 2022-10-11 PROCEDURE — 99213 OFFICE O/P EST LOW 20 MIN: CPT | Performed by: STUDENT IN AN ORGANIZED HEALTH CARE EDUCATION/TRAINING PROGRAM

## 2022-10-11 NOTE — PROGRESS NOTES
[address] VISIT      Patient Name: Chrissy Cortes  Medical Record Number:  [id number]  Primary Care Physician:  Tristen Torres MD    ChiefComplaint     Chief Complaint   Patient presents with    Ear Problem     Recheck ear from tube, popping and cracking went away        History of Present Illness     Chrissy Cortes is an 74 y.o. female previously seen for left chronic serous otitis media, left conductive hearing loss, history of sinus surgery. Status post myringotomy with placement of left-sided PE tube on 7/12/2022. Interval History:   Ever since ear tube placement her ears have felt significantly improved. No longer with cracking or popping in her ears. No otorrhea or otalgia. Currently feels like her allergies are well controlled. Acid reflux well controlled with omeprazole.     Past Medical History     Past Medical History:   Diagnosis Date    Benign neoplasm of colon     GERD (gastroesophageal reflux disease)     Osteoporosis        Past Surgical History     Past Surgical History:   Procedure Laterality Date    SHOULDER SURGERY Left     SINUS SURGERY      TONSILLECTOMY         Family History     Family History   Problem Relation Age of Onset    Heart Disease Mother         heart rhythm    Heart Disease Father         heart rhythm    COPD Father     Esophageal Cancer Brother     Cancer Maternal Grandmother         leukemia       Social History     Social History     Tobacco Use    Smoking status: Never    Smokeless tobacco: Never   Substance Use Topics    Alcohol use: No     Alcohol/week: 0.0 standard drinks    Drug use: No        Allergies     Allergies   Allergen Reactions    Other      INH, for tb inhalation       Medications     Current Outpatient Medications   Medication Sig Dispense Refill    omeprazole (PRILOSEC) 40 MG delayed release capsule TAKE ONE CAPSULE BY MOUTH DAILY 90 capsule 3    SUMAtriptan (IMITREX) 50 MG tablet Take 1 tablet by mouth once as needed for Migraine 9 tablet 2    fluticasone (FLONASE) 50 MCG/ACT nasal spray 2 sprays by Nasal route daily 3 each 3     No current facility-administered medications for this visit. Review of Systems     REVIEW OF SYSTEM: See HPI above    PhysicalExam     Vitals:    10/11/22 0957   BP: 107/71   Pulse: 76   Temp: 97.3 °F (36.3 °C)   TempSrc: Temporal       PHYSICAL EXAM  /71   Pulse 76   Temp 97.3 °F (36.3 °C) (Temporal)     GENERAL: No acute distress, alert and oriented. EYES: EOMI, Anti-icteric. NOSE: On anterior rhinoscopy there is no epistaxis, nasal mucosa moist and normal appearing, no purulent drainage. EARS: Normal external appearance; on portable otomicroscopy:     -Ad: External auditory canal without stenosis, tympanic membrane clear, no middle ear effusions or retractions     -As: External auditory canal without stenosis, tympanic membrane with anterior-inferior PE tube in place, patent. No otorrhea. FACE: HB 1/6 bilaterally, symmetric appearing, sensation equal bilaterally  ORAL CAVITY: No masses or lesions visualized or palpated, uvula is midline, moist mucous membranes, no oropharyngeal masses or oropharyngeal obstruction  NECK: Normal range of motion, no thyromegaly, trachea is midline, no palpable lymphadenopathy or neck masses, no crepitus  CHEST: Normal respiratory effort, breathing comfortably, no retractions  SKIN: No rashes, normal appearing skin, no evidence of skin lesions/tumors  NEURO: Cranial Nerves 2, 3, 4, 5, 6, 7, 11, 12 grossly intact bilaterally     I have performed a head and neck physical exam personally or was physically present during the key or critical portions of the service. Assessment and Plan     1. Tympanostomy tube check  -Your symptoms resolved after placement of PE tube. PE tube appears to be in proper positioning functioning appropriately. Follow-up as needed    2.  Gastroesophageal reflux disease, unspecified whether esophagitis present  -Continue PPI, 40 mg omeprazole daily. Follow-up with PCP for continued long-term management. Follow-Up     Return if symptoms worsen or fail to improve. Isidoro Shukla 44  Department of Otolaryngology/Head and Neck Surgery  10/11/22    Medical Decision Making: The following items were considered in medical decision making:  Independent review of images  Review / order clinical lab tests  Review / order radiology tests  Decision to obtain old records      This note was generated completely or in part utilizing Dragon dictation speech recognition software. Occasionally, words are mistranscribed and despite editing, the text may contain inaccuracies due to incorrect word recognition. If further clarification is needed please contact the office at [phone].

## 2023-02-23 ENCOUNTER — HOSPITAL ENCOUNTER (OUTPATIENT)
Dept: WOMENS IMAGING | Age: 73
Discharge: HOME | End: 2023-02-23
Payer: MEDICARE

## 2023-02-23 DIAGNOSIS — Z12.31 BREAST CANCER SCREENING BY MAMMOGRAM: ICD-10-CM

## 2023-02-23 PROCEDURE — 77067 SCR MAMMO BI INCL CAD: CPT

## 2023-04-24 ENCOUNTER — OFFICE VISIT (OUTPATIENT)
Dept: FAMILY MEDICINE CLINIC | Age: 73
End: 2023-04-24

## 2023-04-24 VITALS
OXYGEN SATURATION: 97 % | BODY MASS INDEX: 31.99 KG/M2 | HEIGHT: 65 IN | WEIGHT: 192 LBS | DIASTOLIC BLOOD PRESSURE: 72 MMHG | HEART RATE: 82 BPM | SYSTOLIC BLOOD PRESSURE: 110 MMHG | TEMPERATURE: 97.3 F

## 2023-04-24 DIAGNOSIS — H61.21 IMPACTED CERUMEN OF RIGHT EAR: ICD-10-CM

## 2023-04-24 DIAGNOSIS — M17.12 PRIMARY OSTEOARTHRITIS OF LEFT KNEE: ICD-10-CM

## 2023-04-24 DIAGNOSIS — L57.0 ACTINIC KERATOSIS: ICD-10-CM

## 2023-04-24 DIAGNOSIS — K21.9 GASTROESOPHAGEAL REFLUX DISEASE WITHOUT ESOPHAGITIS: ICD-10-CM

## 2023-04-24 DIAGNOSIS — E78.00 HYPERCHOLESTEREMIA: ICD-10-CM

## 2023-04-24 DIAGNOSIS — I83.893 VARICOSE VEINS OF BOTH LEGS WITH EDEMA: ICD-10-CM

## 2023-04-24 DIAGNOSIS — Z00.00 MEDICARE ANNUAL WELLNESS VISIT, SUBSEQUENT: Primary | ICD-10-CM

## 2023-04-24 DIAGNOSIS — R73.9 HYPERGLYCEMIA: ICD-10-CM

## 2023-04-24 RX ORDER — FLUTICASONE PROPIONATE 50 MCG
2 SPRAY, SUSPENSION (ML) NASAL DAILY
Qty: 3 EACH | Refills: 3 | Status: SHIPPED | OUTPATIENT
Start: 2023-04-24

## 2023-04-24 RX ORDER — OMEPRAZOLE 40 MG/1
CAPSULE, DELAYED RELEASE ORAL
Qty: 90 CAPSULE | Refills: 3 | Status: SHIPPED | OUTPATIENT
Start: 2023-04-24

## 2023-04-24 RX ORDER — SUMATRIPTAN 50 MG/1
50 TABLET, FILM COATED ORAL
Qty: 9 TABLET | Refills: 2 | Status: SHIPPED | OUTPATIENT
Start: 2023-04-24 | End: 2023-04-24

## 2023-04-24 SDOH — ECONOMIC STABILITY: INCOME INSECURITY: HOW HARD IS IT FOR YOU TO PAY FOR THE VERY BASICS LIKE FOOD, HOUSING, MEDICAL CARE, AND HEATING?: NOT HARD AT ALL

## 2023-04-24 SDOH — ECONOMIC STABILITY: FOOD INSECURITY: WITHIN THE PAST 12 MONTHS, YOU WORRIED THAT YOUR FOOD WOULD RUN OUT BEFORE YOU GOT MONEY TO BUY MORE.: NEVER TRUE

## 2023-04-24 SDOH — ECONOMIC STABILITY: HOUSING INSECURITY
IN THE LAST 12 MONTHS, WAS THERE A TIME WHEN YOU DID NOT HAVE A STEADY PLACE TO SLEEP OR SLEPT IN A SHELTER (INCLUDING NOW)?: NO

## 2023-04-24 SDOH — ECONOMIC STABILITY: FOOD INSECURITY: WITHIN THE PAST 12 MONTHS, THE FOOD YOU BOUGHT JUST DIDN'T LAST AND YOU DIDN'T HAVE MONEY TO GET MORE.: NEVER TRUE

## 2023-04-24 ASSESSMENT — PATIENT HEALTH QUESTIONNAIRE - PHQ9
2. FEELING DOWN, DEPRESSED OR HOPELESS: 0
SUM OF ALL RESPONSES TO PHQ QUESTIONS 1-9: 0
1. LITTLE INTEREST OR PLEASURE IN DOING THINGS: 0
SUM OF ALL RESPONSES TO PHQ QUESTIONS 1-9: 0
SUM OF ALL RESPONSES TO PHQ QUESTIONS 1-9: 0
SUM OF ALL RESPONSES TO PHQ9 QUESTIONS 1 & 2: 0
SUM OF ALL RESPONSES TO PHQ QUESTIONS 1-9: 0

## 2023-04-24 ASSESSMENT — LIFESTYLE VARIABLES
HOW OFTEN DO YOU HAVE A DRINK CONTAINING ALCOHOL: NEVER
HOW MANY STANDARD DRINKS CONTAINING ALCOHOL DO YOU HAVE ON A TYPICAL DAY: PATIENT DOES NOT DRINK

## 2023-04-24 NOTE — PATIENT INSTRUCTIONS
Learning About Vision Tests  What are vision tests? The four most common vision tests are visual acuity tests, refraction, visual field tests, and color vision tests. Visual acuity (sharpness) tests  These tests are used: To see if you need glasses or contact lenses. To monitor an eye problem. To check an eye injury. Visual acuity tests are done as part of routine exams. You may also have this test when you get your 's license or apply for some types of jobs. Visual field tests  These tests are used: To check for vision loss in any area of your range of vision. To screen for certain eye diseases. To look for nerve damage after a stroke, head injury, or other problem that could reduce blood flow to the brain. Refraction and color tests  A refraction test is done to find the right prescription for glasses and contact lenses. A color vision test is done to check for color blindness. Color vision is often tested as part of a routine exam. You may also have this test when you apply for a job where recognizing different colors is important, such as , electronics, or the [employer]. How are vision tests done? Visual acuity test   You cover one eye at a time. You read aloud from a wall chart across the room. You read aloud from a small card that you hold in your hand. Refraction   You look into a special device. The device puts lenses of different strengths in front of each eye to see how strong your glasses or contact lenses need to be. Visual field tests   Your doctor may have you look through special machines. Or your doctor may simply have you stare straight ahead while they move a finger into and out of your field of vision. Color vision test   You look at pieces of printed test patterns in various colors. You say what number or symbol you see. Your doctor may have you trace the number or symbol using a pointer. How do these tests feel?   There is very little chance of

## 2023-04-25 ENCOUNTER — HOSPITAL ENCOUNTER (OUTPATIENT)
Age: 73
Discharge: HOME | End: 2023-04-25
Payer: MEDICARE

## 2023-04-25 DIAGNOSIS — R73.9 HYPERGLYCEMIA: ICD-10-CM

## 2023-04-25 DIAGNOSIS — E78.00 HYPERCHOLESTEREMIA: ICD-10-CM

## 2023-04-25 LAB
ALBUMIN SERPL-MCNC: 4.1 G/DL (ref 3.4–5)
ALBUMIN/GLOB SERPL: 1.6 {RATIO} (ref 1.1–2.2)
ALP SERPL-CCNC: 65 U/L (ref 40–129)
ALT SERPL-CCNC: 12 U/L (ref 10–40)
ANION GAP SERPL CALCULATED.3IONS-SCNC: 10 MMOL/L (ref 3–16)
AST SERPL-CCNC: 18 U/L (ref 15–37)
BILIRUB SERPL-MCNC: 1 MG/DL (ref 0–1)
BUN SERPL-MCNC: 13 MG/DL (ref 7–20)
CALCIUM SERPL-MCNC: 9.2 MG/DL (ref 8.3–10.6)
CHLORIDE SERPL-SCNC: 107 MMOL/L (ref 99–110)
CHOLEST SERPL-MCNC: 253 MG/DL (ref 0–199)
CO2 SERPL-SCNC: 27 MMOL/L (ref 21–32)
CREAT SERPL-MCNC: 0.6 MG/DL (ref 0.6–1.2)
GFR SERPLBLD CREATININE-BSD FMLA CKD-EPI: >60 ML/MIN/{1.73_M2}
GLUCOSE SERPL-MCNC: 97 MG/DL (ref 70–99)
HDLC SERPL-MCNC: 69 MG/DL (ref 40–60)
LDLC SERPL CALC-MCNC: 163 MG/DL
POTASSIUM SERPL-SCNC: 4.7 MMOL/L (ref 3.5–5.1)
PROT SERPL-MCNC: 6.7 G/DL (ref 6.4–8.2)
SODIUM SERPL-SCNC: 144 MMOL/L (ref 136–145)
TRIGL SERPL-MCNC: 106 MG/DL (ref 0–150)
VLDLC SERPL CALC-MCNC: 21 MG/DL

## 2023-04-25 PROCEDURE — 83036 HEMOGLOBIN GLYCOSYLATED A1C: CPT

## 2023-04-25 PROCEDURE — 80053 COMPREHEN METABOLIC PANEL: CPT

## 2023-04-25 PROCEDURE — 80061 LIPID PANEL: CPT

## 2023-04-25 PROCEDURE — 36415 COLL VENOUS BLD VENIPUNCTURE: CPT

## 2023-04-26 LAB
EST. AVERAGE GLUCOSE BLD GHB EST-MCNC: 116.9 MG/DL
HBA1C MFR BLD: 5.7 %

## 2023-11-02 ENCOUNTER — OFFICE VISIT (OUTPATIENT)
Dept: FAMILY MEDICINE CLINIC | Age: 73
End: 2023-11-02

## 2023-11-02 VITALS
TEMPERATURE: 97.3 F | DIASTOLIC BLOOD PRESSURE: 82 MMHG | WEIGHT: 193 LBS | RESPIRATION RATE: 12 BRPM | HEART RATE: 76 BPM | OXYGEN SATURATION: 98 % | BODY MASS INDEX: 32.62 KG/M2 | SYSTOLIC BLOOD PRESSURE: 110 MMHG

## 2023-11-02 DIAGNOSIS — L57.0 ACTINIC KERATOSIS: Primary | ICD-10-CM

## 2023-11-02 DIAGNOSIS — K64.4 EXTERNAL HEMORRHOIDS: ICD-10-CM

## 2023-11-02 DIAGNOSIS — Z78.0 POST-MENOPAUSAL: ICD-10-CM

## 2023-11-02 DIAGNOSIS — S00.83XA CONTUSION OF FACE, INITIAL ENCOUNTER: ICD-10-CM

## 2023-11-02 RX ORDER — SUMATRIPTAN 50 MG/1
50 TABLET, FILM COATED ORAL DAILY PRN
Qty: 9 TABLET | Refills: 3 | Status: SHIPPED | OUTPATIENT
Start: 2023-11-02

## 2023-11-02 NOTE — PROGRESS NOTES
Subjective:      Patient ID: Juma Nettles is a 74 y.o. female. CC: Patient presents for re-evaluation of chronic health problems including skin lesions, rectal skin tags, recent fall and concerns of right facial pain and osteoporosis screening. .    HPI pt is here for a follow up, med refill, and will like to get a referral for a colorectal doctor as she has some tags in her rectum that are causing problems right now. Patient's had several colonoscopies and was told she has some external skin tags but she is causing more irritation at this time. She denies any blood with bowel movements. She states she has normal bowel movements without straining. Review of colonoscopy demonstrates diverticulosis and multiple polyps removed in the past.  Pt also stated that she has some spots on her nose and face. Pt also has a bump on her left sided(head) that wants to be look at. States she fell about 2 to 3 weeks ago sustaining most of her injury to her lower back as she fell down some steps. She also did hit the right side of her face and is now concerned that she possibly has a facial fracture. She feels the lower back and bruising has resolved.     Review of Systems  Patient Active Problem List   Diagnosis    GERD (gastroesophageal reflux disease)    Osteoporosis    Varicose veins of both legs with edema    Primary osteoarthritis of left knee    Allergic rhinitis       Outpatient Medications Marked as Taking for the 11/2/23 encounter (Office Visit) with Marlon Rich MD   Medication Sig Dispense Refill    omeprazole (PRILOSEC) 40 MG delayed release capsule TAKE ONE CAPSULE BY MOUTH DAILY 90 capsule 3    fluticasone (FLONASE) 50 MCG/ACT nasal spray 2 sprays by Nasal route daily 3 each 3    SUMAtriptan (IMITREX) 50 MG tablet Take 1 tablet by mouth once as needed for Migraine 9 tablet 2       Allergies   Allergen Reactions    Other      INH, for tb inhalation       Social History     Tobacco Use    Smoking status:

## 2023-11-03 NOTE — PROGRESS NOTES
Cryotherapy Procedure Note    Pre-operative Diagnosis:actinic keratosis 3    Post-operative Diagnosis: same    Locations: Tip of nose, right cheek and left upper chest    Procedure Details   2 cycles of liquid nitrogen applied to affected sites. Complications: none. Plan:  1. Patient was educated regarding the potential risks of blister formation, discomfort, hypopigmentation, and scar. 2. Wound care was discussed. 3. Recommended that the patient use OTC analgesics as needed for pain. 4. Plan for RTC in 3-4 weeks for re-treatment if needed.

## 2024-01-15 RX ORDER — FLUTICASONE PROPIONATE 50 MCG
2 SPRAY, SUSPENSION (ML) NASAL DAILY
Qty: 1 EACH | Refills: 11 | Status: SHIPPED | OUTPATIENT
Start: 2024-01-15

## 2024-02-29 ENCOUNTER — HOSPITAL ENCOUNTER (OUTPATIENT)
Dept: WOMENS IMAGING | Age: 74
Discharge: HOME | End: 2024-02-29
Payer: MEDICARE

## 2024-02-29 VITALS — BODY MASS INDEX: 32.95 KG/M2 | HEIGHT: 64 IN | WEIGHT: 193 LBS

## 2024-02-29 DIAGNOSIS — Z12.31 SCREENING MAMMOGRAM FOR BREAST CANCER: ICD-10-CM

## 2024-02-29 PROCEDURE — 77063 BREAST TOMOSYNTHESIS BI: CPT

## 2024-03-05 DIAGNOSIS — K21.9 GASTROESOPHAGEAL REFLUX DISEASE WITHOUT ESOPHAGITIS: ICD-10-CM

## 2024-03-05 NOTE — TELEPHONE ENCOUNTER
[facility].  Received [facility] day supply for flonase. Rx sent to [facility] at the beginning of the year. Just want to confirm pharmacy where Rx needs to be send to

## 2024-03-06 NOTE — TELEPHONE ENCOUNTER
Patient stopped in and states that her insurance changed, and now all of [facility] [address]    Please advise

## 2024-03-07 RX ORDER — SUMATRIPTAN 50 MG/1
50 TABLET, FILM COATED ORAL DAILY PRN
Qty: 9 TABLET | Refills: 1 | Status: SHIPPED | OUTPATIENT
Start: 2024-03-07

## 2024-03-07 RX ORDER — FLUTICASONE PROPIONATE 50 MCG
2 SPRAY, SUSPENSION (ML) NASAL DAILY
Qty: 3 EACH | Refills: 0 | Status: SHIPPED | OUTPATIENT
Start: 2024-03-07

## 2024-03-07 RX ORDER — OMEPRAZOLE 40 MG/1
CAPSULE, DELAYED RELEASE ORAL
Qty: 90 CAPSULE | Refills: 0 | Status: SHIPPED | OUTPATIENT
Start: 2024-03-07

## 2024-05-07 ENCOUNTER — OFFICE VISIT (OUTPATIENT)
Dept: FAMILY MEDICINE CLINIC | Age: 74
End: 2024-05-07

## 2024-05-07 VITALS
SYSTOLIC BLOOD PRESSURE: 108 MMHG | RESPIRATION RATE: 12 BRPM | BODY MASS INDEX: 32.33 KG/M2 | OXYGEN SATURATION: 95 % | WEIGHT: 188.38 LBS | TEMPERATURE: 97.5 F | DIASTOLIC BLOOD PRESSURE: 78 MMHG | HEART RATE: 73 BPM

## 2024-05-07 DIAGNOSIS — K21.9 GASTROESOPHAGEAL REFLUX DISEASE WITHOUT ESOPHAGITIS: ICD-10-CM

## 2024-05-07 DIAGNOSIS — L57.0 ACTINIC KERATOSIS: ICD-10-CM

## 2024-05-07 DIAGNOSIS — H91.92 HEARING LOSS OF LEFT EAR, UNSPECIFIED HEARING LOSS TYPE: ICD-10-CM

## 2024-05-07 DIAGNOSIS — E78.00 HYPERCHOLESTEREMIA: ICD-10-CM

## 2024-05-07 DIAGNOSIS — Z00.00 MEDICARE ANNUAL WELLNESS VISIT, SUBSEQUENT: Primary | ICD-10-CM

## 2024-05-07 DIAGNOSIS — R73.9 HYPERGLYCEMIA: ICD-10-CM

## 2024-05-07 RX ORDER — SUMATRIPTAN 50 MG/1
50 TABLET, FILM COATED ORAL DAILY PRN
Qty: 9 TABLET | Refills: 1 | Status: SHIPPED | OUTPATIENT
Start: 2024-05-07

## 2024-05-07 RX ORDER — OMEPRAZOLE 40 MG/1
CAPSULE, DELAYED RELEASE ORAL
Qty: 90 CAPSULE | Refills: 3 | Status: SHIPPED | OUTPATIENT
Start: 2024-05-07

## 2024-05-07 RX ORDER — FLUTICASONE PROPIONATE 50 MCG
2 SPRAY, SUSPENSION (ML) NASAL DAILY
Qty: 3 EACH | Refills: 3 | Status: SHIPPED | OUTPATIENT
Start: 2024-05-07

## 2024-05-07 SDOH — ECONOMIC STABILITY: FOOD INSECURITY: WITHIN THE PAST 12 MONTHS, YOU WORRIED THAT YOUR FOOD WOULD RUN OUT BEFORE YOU GOT MONEY TO BUY MORE.: NEVER TRUE

## 2024-05-07 SDOH — ECONOMIC STABILITY: FOOD INSECURITY: WITHIN THE PAST 12 MONTHS, THE FOOD YOU BOUGHT JUST DIDN'T LAST AND YOU DIDN'T HAVE MONEY TO GET MORE.: NEVER TRUE

## 2024-05-07 SDOH — ECONOMIC STABILITY: INCOME INSECURITY: HOW HARD IS IT FOR YOU TO PAY FOR THE VERY BASICS LIKE FOOD, HOUSING, MEDICAL CARE, AND HEATING?: NOT HARD AT ALL

## 2024-05-07 ASSESSMENT — PATIENT HEALTH QUESTIONNAIRE - PHQ9
2. FEELING DOWN, DEPRESSED OR HOPELESS: NOT AT ALL
SUM OF ALL RESPONSES TO PHQ9 QUESTIONS 1 & 2: 0
SUM OF ALL RESPONSES TO PHQ QUESTIONS 1-9: 0
1. LITTLE INTEREST OR PLEASURE IN DOING THINGS: NOT AT ALL

## 2024-05-07 NOTE — PROGRESS NOTES
Medicare Annual Wellness Visit    Berenice Manzo is here for Medicare AWV    Assessment & Plan   Medicare annual wellness visit, subsequent  Gastroesophageal reflux disease without esophagitis  -     omeprazole (PRILOSEC) 40 MG delayed release capsule; TAKE ONE CAPSULE BY MOUTH DAILY, Disp-90 capsule, R-3Normal  Hearing loss of left ear, unspecified hearing loss type  -     [facility], [place], , Otolaryngology, [facility]  Hyperglycemia  -     Comprehensive Metabolic Panel; Future  -     Hemoglobin A1C; Future  Hypercholesteremia  -     Comprehensive Metabolic Panel; Future  -     Lipid Panel; Future  Actinic keratosis  -     DE DESTRUCTION PREMALIGNANT LESION 2-14 EA  -     DE DESTRUCTION PREMALIGNANT LESION 1ST    Recommendations for Preventive Services Due: see orders and patient instructions/AVS.  Recommended screening schedule for the next 5-10 years is provided to the patient in written form: see Patient Instructions/AVS.     Return in about 6 months (around 11/7/2024).     Subjective   The following acute and/or chronic problems were also addressed today:  Patient presents for annual Medicare visit and follow-up of chronic health issues.  She is concerned that she has a new skin lesion on the right side of her naris and the tip of her nose and 1 on the right cheek.  All the other areas were treated with cryotherapy in the past have resolved.  She is also concerned that she has hearing loss of the left ear.  This seems to have occurred over the last 6+ months.  She removed a large amount of wax buildup this morning but still feels her hearing is impaired.Patient is very compliant with medications with no adverse reactions.  She rarely has a headache but does request a refill of the Imitrex medication.    Berenice was seen today for medicare awv.    Diagnoses and all orders for this visit:    Medicare annual wellness visit, subsequent    Gastroesophageal reflux disease without esophagitis  -

## 2024-05-08 NOTE — PROGRESS NOTES
Cryotherapy Procedure Note    Pre-operative Diagnosis:actinic keratosis 3    Post-operative Diagnosis: same    Locations:face    Procedure Details   2 cycles of liquid nitrogen applied to affected sites.    Complications: none.    Plan:  1. Patient was educated regarding the potential risks of blister formation, discomfort, hypopigmentation, and scar.   2. Wound care was discussed.    3. Recommended that the patient use OTC analgesics as needed for pain.   4. Plan for RTC in 3-4 weeks for re-treatment if needed.

## 2024-06-05 RX ORDER — SUMATRIPTAN 50 MG/1
50 TABLET, FILM COATED ORAL DAILY PRN
Qty: 9 TABLET | Refills: 1 | Status: SHIPPED | OUTPATIENT
Start: 2024-06-05

## 2024-08-26 RX ORDER — SUMATRIPTAN 50 MG/1
50 TABLET, FILM COATED ORAL DAILY PRN
Qty: 9 TABLET | Refills: 1 | Status: SHIPPED | OUTPATIENT
Start: 2024-08-26

## 2024-09-24 RX ORDER — SUMATRIPTAN 50 MG/1
50 TABLET, FILM COATED ORAL DAILY PRN
Qty: 12 TABLET | Refills: 1 | Status: SHIPPED | OUTPATIENT
Start: 2024-09-24

## 2024-10-30 ENCOUNTER — HOSPITAL ENCOUNTER (OUTPATIENT)
Age: 74
Discharge: HOME | End: 2024-10-30
Payer: MEDICARE

## 2024-10-30 DIAGNOSIS — E78.00 HYPERCHOLESTEREMIA: ICD-10-CM

## 2024-10-30 DIAGNOSIS — R73.9 HYPERGLYCEMIA: ICD-10-CM

## 2024-10-30 LAB
ALBUMIN SERPL-MCNC: 4.3 G/DL (ref 3.4–5)
ALBUMIN/GLOB SERPL: 1.7 {RATIO} (ref 1.1–2.2)
ALP SERPL-CCNC: 69 U/L (ref 40–129)
ALT SERPL-CCNC: 12 U/L (ref 10–40)
ANION GAP SERPL CALCULATED.3IONS-SCNC: 10 MMOL/L (ref 3–16)
AST SERPL-CCNC: 18 U/L (ref 15–37)
BILIRUB SERPL-MCNC: 1.1 MG/DL (ref 0–1)
BUN SERPL-MCNC: 13 MG/DL (ref 7–20)
CALCIUM SERPL-MCNC: 9.3 MG/DL (ref 8.3–10.6)
CHLORIDE SERPL-SCNC: 103 MMOL/L (ref 99–110)
CHOLEST SERPL-MCNC: 251 MG/DL (ref 0–199)
CO2 SERPL-SCNC: 27 MMOL/L (ref 21–32)
CREAT SERPL-MCNC: 0.7 MG/DL (ref 0.6–1.2)
EST. AVERAGE GLUCOSE BLD GHB EST-MCNC: 114 MG/DL
GFR SERPLBLD CREATININE-BSD FMLA CKD-EPI: 89 ML/MIN/{1.73_M2}
GLUCOSE SERPL-MCNC: 85 MG/DL (ref 70–99)
HBA1C MFR BLD: 5.6 %
HDLC SERPL-MCNC: 60 MG/DL (ref 40–60)
LDLC SERPL CALC-MCNC: 168 MG/DL
POTASSIUM SERPL-SCNC: 4 MMOL/L (ref 3.5–5.1)
PROT SERPL-MCNC: 6.9 G/DL (ref 6.4–8.2)
SODIUM SERPL-SCNC: 140 MMOL/L (ref 136–145)
TRIGL SERPL-MCNC: 113 MG/DL (ref 0–150)
VLDLC SERPL CALC-MCNC: 23 MG/DL

## 2024-10-30 PROCEDURE — 36415 COLL VENOUS BLD VENIPUNCTURE: CPT

## 2024-10-30 PROCEDURE — 80053 COMPREHEN METABOLIC PANEL: CPT

## 2024-10-30 PROCEDURE — 83036 HEMOGLOBIN GLYCOSYLATED A1C: CPT

## 2024-10-30 PROCEDURE — 80061 LIPID PANEL: CPT

## 2024-11-12 ENCOUNTER — OFFICE VISIT (OUTPATIENT)
Dept: FAMILY MEDICINE CLINIC | Age: 74
End: 2024-11-12

## 2024-11-12 VITALS
HEART RATE: 68 BPM | TEMPERATURE: 97.2 F | DIASTOLIC BLOOD PRESSURE: 72 MMHG | BODY MASS INDEX: 32.1 KG/M2 | OXYGEN SATURATION: 98 % | RESPIRATION RATE: 12 BRPM | SYSTOLIC BLOOD PRESSURE: 110 MMHG | WEIGHT: 187 LBS

## 2024-11-12 DIAGNOSIS — I83.893 VARICOSE VEINS OF BOTH LEGS WITH EDEMA: ICD-10-CM

## 2024-11-12 DIAGNOSIS — R73.9 HYPERGLYCEMIA: ICD-10-CM

## 2024-11-12 DIAGNOSIS — L57.0 ACTINIC KERATOSIS: ICD-10-CM

## 2024-11-12 DIAGNOSIS — Z78.0 POST-MENOPAUSAL: ICD-10-CM

## 2024-11-12 DIAGNOSIS — M17.12 PRIMARY OSTEOARTHRITIS OF LEFT KNEE: Primary | ICD-10-CM

## 2024-11-12 RX ORDER — SUMATRIPTAN 50 MG/1
50 TABLET, FILM COATED ORAL DAILY PRN
Qty: 12 TABLET | Refills: 5 | Status: SHIPPED | OUTPATIENT
Start: 2024-11-12

## 2024-11-13 PROBLEM — R73.9 HYPERGLYCEMIA: Status: ACTIVE | Noted: 2024-11-13

## 2024-11-13 NOTE — PROGRESS NOTES
Cryotherapy Procedure Note    Pre-operative Diagnosis:actinic keratosis 3    Post-operative Diagnosis: same    Locations:feet    Procedure Details   2 cycles of liquid nitrogen applied to affected sites.    Complications: none.    Plan:  1. Patient was educated regarding the potential risks of blister formation, discomfort, hypopigmentation, and scar.   2. Wound care was discussed.    3. Recommended that the patient use OTC analgesics as needed for pain.   4. Plan for RTC in 3-4 weeks for re-treatment if needed.    
although we did discuss that if the skin lesion for not resolved in the next month she would need to be referred to plastic surgeon for consultation of the nose lesion    Proceed with DEXA scan in regards to the history of osteoporosis    Maintain current diet and exercise in regards to hyperglycemia and arthritis.    Maintain support stockings for varicosities    Patient received counseling on the following healthy behaviors: nutrition and exercise     Patient given educational materials     Health maintenance updated    Discussed use, benefit, and side effects of prescribed medications.  Barriers to medication compliance addressed.  All patient questions answered.  Pt voiced understanding.     Patient needs RTC in 6 months for annual Medicare visit.    Medical decision making of moderate complexity.    Please note that this chart was generated using Dragon dictation software. Although every effort was made to ensure the accuracy of this automated transcription, some errors in transcription may have occurred.

## 2024-12-10 NOTE — PROGRESS NOTES
Medicare Annual Wellness Visit    Raymundo Hamlin is here for Medicare AWV    Assessment & Plan   Medicare annual wellness visit, subsequent  Gastroesophageal reflux disease without esophagitis  -     omeprazole (PRILOSEC) 40 MG delayed release capsule; TAKE ONE CAPSULE BY MOUTH DAILY, Disp-90 capsule, R-3Normal  Hypercholesteremia  -     Comprehensive Metabolic Panel; Future  -     Lipid Panel; Future  Hyperglycemia  -     Comprehensive Metabolic Panel; Future  -     Hemoglobin A1C; Future  Impacted cerumen of right ear  Varicose veins of both legs with edema  Primary osteoarthritis of left knee    Recommendations for Preventive Services Due: see orders and patient instructions/AVS.  Recommended screening schedule for the next 5-10 years is provided to the patient in written form: see Patient Instructions/AVS.     Return in about 1 year (around 4/24/2024). Subjective   The following acute and/or chronic problems were also addressed today:  Patient presents for annual Medicare visit follow-up of chronic health issues. She is having issues with hearing at this point of time. She had a PE tube placed in her left ear sometime ago and her  tried irrigating the left ear when she feels this is better but she still having difficulty with hearing. She otherwise feels she is stable. She has varicosities in both lower extremities and typically wears her support stockings but did not wear them today. Her arthritis in her left knee does restrict her activity. She would like to lose weight and wants additional direction. He does really follow a diet plan nor does she exercise regularly. Patient feels her GERD symptoms are controlled with current treatment plan. Patient also has several skin lesions on her face that she is concerned about. Migdalia Balderrama was seen today for medicare awv.     Diagnoses and all orders for this visit:    Medicare annual wellness visit, subsequent    Gastroesophageal reflux disease without Yes

## 2025-03-13 ENCOUNTER — HOSPITAL ENCOUNTER (OUTPATIENT)
Dept: WOMENS IMAGING | Age: 75
Discharge: HOME | End: 2025-03-13
Payer: MEDICARE

## 2025-03-13 VITALS — HEIGHT: 66 IN | WEIGHT: 175 LBS | BODY MASS INDEX: 28.12 KG/M2

## 2025-03-13 DIAGNOSIS — Z12.31 VISIT FOR SCREENING MAMMOGRAM: ICD-10-CM

## 2025-03-13 PROCEDURE — 77063 BREAST TOMOSYNTHESIS BI: CPT

## 2025-04-12 PROBLEM — R73.03 PREDIABETES: Status: ACTIVE | Noted: 2024-11-13

## 2025-04-13 NOTE — PROGRESS NOTES
Office Note  4/15/2025  Patient Name: Berenice Manzo  MRN: [id number] : 1950    SUBJECTIVE:     CHIEF COMPLAINT:  Chief Complaint   Patient presents with    Medicare AWV     Pt feels light headed today.        HISTORY OF PRESENT ILLNESS:  Former [facility] patient, establishing care. She presents today with the following concerns and/or to follow up on these chronic conditions:    History of Present Illness  The patient presents for evaluation of lightheadedness, toenail fungus, migraines, sinus issues, heartburn, and health maintenance.    She has been experiencing episodes of dizziness and lightheadedness, particularly after showering or bending over to apply lotion on her legs. These episodes have been occurring intermittently over the past few days, often upon rising in the morning to use the bathroom. These symptoms are transient and resolve upon lying down. An increase in blood pressure has been noted, which is typically in the 120s range. Inadequate hydration is acknowledged, and she plans to increase water intake. A history of poor balance is reported, with difficulty standing on one foot or toe, and periodic ear cleaning is required due to excessive wax accumulation.    A fungal infection on the big toe is reported, believed to potentially respond to antibiotic treatment. She does not pick her toes. A previous toenail was lost due to trauma from a dropped box of paper at school. The big toe is longer than the other toes, causing it to turn under, and attempts to alleviate this with larger toe box shoes have been unsuccessful.    She suffers from severe migraines, for which sumatriptan is taken and found effective. Usage is limited due to a restricted supply of 12 pills. Sinus issues are also experienced, with ear and eye pain when exposed to wind. Consultations with an otolaryngologist and ophthalmologist revealed no abnormalities. Mild hearing loss in the left ear is noted, along with a history of sinus

## 2025-04-15 ENCOUNTER — OFFICE VISIT (OUTPATIENT)
Dept: FAMILY MEDICINE CLINIC | Age: 75
End: 2025-04-15

## 2025-04-15 VITALS
HEART RATE: 79 BPM | DIASTOLIC BLOOD PRESSURE: 86 MMHG | SYSTOLIC BLOOD PRESSURE: 136 MMHG | OXYGEN SATURATION: 97 % | WEIGHT: 187.8 LBS | RESPIRATION RATE: 18 BRPM | TEMPERATURE: 97.2 F | BODY MASS INDEX: 31.29 KG/M2 | HEIGHT: 65 IN

## 2025-04-15 DIAGNOSIS — Z00.00 MEDICARE ANNUAL WELLNESS VISIT, SUBSEQUENT: ICD-10-CM

## 2025-04-15 DIAGNOSIS — I95.1 ORTHOSTASIS: ICD-10-CM

## 2025-04-15 DIAGNOSIS — M81.0 AGE-RELATED OSTEOPOROSIS WITHOUT CURRENT PATHOLOGICAL FRACTURE: ICD-10-CM

## 2025-04-15 DIAGNOSIS — R73.03 PREDIABETES: ICD-10-CM

## 2025-04-15 DIAGNOSIS — E78.2 MIXED HYPERLIPIDEMIA: ICD-10-CM

## 2025-04-15 DIAGNOSIS — B35.1 ONYCHOMYCOSIS: ICD-10-CM

## 2025-04-15 DIAGNOSIS — H69.93 DYSFUNCTION OF BOTH EUSTACHIAN TUBES: ICD-10-CM

## 2025-04-15 DIAGNOSIS — I83.893 VARICOSE VEINS OF BOTH LEGS WITH EDEMA: ICD-10-CM

## 2025-04-15 DIAGNOSIS — K21.9 GASTROESOPHAGEAL REFLUX DISEASE WITHOUT ESOPHAGITIS: ICD-10-CM

## 2025-04-15 DIAGNOSIS — Z76.89 ENCOUNTER TO ESTABLISH CARE: Primary | ICD-10-CM

## 2025-04-15 DIAGNOSIS — G43.009 MIGRAINE WITHOUT AURA AND WITHOUT STATUS MIGRAINOSUS, NOT INTRACTABLE: ICD-10-CM

## 2025-04-15 RX ORDER — FAMOTIDINE 20 MG/1
20 TABLET, FILM COATED ORAL 2 TIMES DAILY
Qty: 60 TABLET | Refills: 3 | Status: SHIPPED | OUTPATIENT
Start: 2025-04-15

## 2025-04-15 SDOH — ECONOMIC STABILITY: FOOD INSECURITY: WITHIN THE PAST 12 MONTHS, YOU WORRIED THAT YOUR FOOD WOULD RUN OUT BEFORE YOU GOT MONEY TO BUY MORE.: NEVER TRUE

## 2025-04-15 SDOH — ECONOMIC STABILITY: FOOD INSECURITY: WITHIN THE PAST 12 MONTHS, THE FOOD YOU BOUGHT JUST DIDN'T LAST AND YOU DIDN'T HAVE MONEY TO GET MORE.: NEVER TRUE

## 2025-04-15 ASSESSMENT — PATIENT HEALTH QUESTIONNAIRE - PHQ9
SUM OF ALL RESPONSES TO PHQ QUESTIONS 1-9: 0
1. LITTLE INTEREST OR PLEASURE IN DOING THINGS: NOT AT ALL
SUM OF ALL RESPONSES TO PHQ QUESTIONS 1-9: 0
2. FEELING DOWN, DEPRESSED OR HOPELESS: NOT AT ALL

## 2025-04-15 NOTE — PROGRESS NOTES
MEDICARE ANNUAL WELLNESS VISIT    Patient is here for their Medicare Annual Wellness Visit yes    Last eye exam: 1/2025  Last dental exam: 4/2025  Exercise:  daily, walking  Do you eat balanced/healthy meals regularly? Yes    How would you rate your overall health? : Good            4/15/2025     1:50 PM 5/7/2024     8:59 AM 4/24/2023     8:50 AM 4/20/2022     9:52 AM 8/14/2015     9:36 AM   Fall Risk   Do you feel unsteady or are you worried about falling?  no no no no    2 or more falls in past year? no no no no no   Fall with injury in past year? no no no no            4/15/2025     1:51 PM 5/7/2024     8:59 AM 4/24/2023     8:50 AM 4/20/2022     9:52 AM 8/14/2015     9:36 AM   PHQ Scores   PHQ2 Score 0 0 0 0 0   PHQ9 Score 0 0 0 0 0         Do you always wear a seat belt in the car?: Yes      Have you noted any problems with hearing?: Yes pt has hearing lost in left ear  Have you noted any vision problems?: No  Do you have concerns about your sexual health?: no  In the past month how much has pain been an issue for you?:  A little bit  In the past month have you had issues with anxiety, loneliness, irritability or fatigue:  Not at all    Do you take opioid medications even sometimes? No    Living Will and/or Healthcare POA: Yes,   Copy on file      Healthcare Decision Maker:    Primary Decision Maker: Khadar Manzo - Spouse - [phone]    Secondary Decision Maker: Lucille Dewitt - Child - [phone]           Who lives at home with you:   Do you have any pets? dog  Do you have any services coming to your home (meals on wheels, home health, etc) ?: no      Do you need help with:  Using the phone:  No  Bathing: No  Dressing:  No  Toileting: No  Transportation:  No  Shopping: No  Preparing meals: No  Housework/Laundry: No  Medications: No  Money management: No    Does your home have:  Unsecured throw rugs: No  Grab bars in bathroom: No  Walk in shower: Yes  Seat in shower: Yes  Lit pathways for night

## 2025-04-16 NOTE — PATIENT INSTRUCTIONS
can help you develop a safe and effective exercise program.  A counselor or psychiatrist can help you cope with issues such as depression, anxiety, or family problems that can make it hard to focus on weight loss.  Consider joining a support group for people who are trying to lose weight. Your doctor can suggest groups in your area.  Where can you learn more?  Go to https://www.[employer].net/patientEd and enter U357 to learn more about \"Starting a Weight-Loss Plan: Care Instructions.\"  Current as of: April 30, 2024  Content Version: 14.4  © 8779-8701 [employer].   Care instructions adapted under license by [employer]. If you have questions about a medical condition or this instruction, always ask your healthcare professional. [employer] disclaims any warranty or liability for your use of this information.         A Healthy Heart: Care Instructions  Overview     Coronary artery disease, also called heart disease, occurs when a substance called plaque builds up in the vessels that supply oxygen-rich blood to your heart muscle. This can narrow the blood vessels and reduce blood flow. A heart attack happens when blood flow is completely blocked. A high-fat diet, smoking, and other factors increase the risk of heart disease.  Your doctor has found that you have a chance of having heart disease. A heart-healthy lifestyle can help keep your heart healthy and prevent heart disease. This lifestyle includes eating healthy, being active, staying at a weight that's healthy for you, and not smoking or using tobacco. It also includes taking medicines as directed, managing other health conditions, and trying to get a healthy amount of sleep.  Follow-up care is a key part of your treatment and safety. Be sure to make and go to all appointments, and call your doctor if you are having problems. It's also a good idea to know your test results and keep a list of the medicines you take.  How

## 2025-04-25 DIAGNOSIS — K21.9 GASTROESOPHAGEAL REFLUX DISEASE WITHOUT ESOPHAGITIS: ICD-10-CM

## 2025-04-25 RX ORDER — OMEPRAZOLE 40 MG/1
CAPSULE, DELAYED RELEASE ORAL
Qty: 90 CAPSULE | Refills: 3 | Status: SHIPPED | OUTPATIENT
Start: 2025-04-25

## 2025-04-25 NOTE — TELEPHONE ENCOUNTER
Medication:   Requested Prescriptions     Pending Prescriptions Disp Refills    omeprazole (PRILOSEC) 40 MG delayed release capsule 90 capsule 3     Sig: TAKE ONE CAPSULE BY MOUTH DAILY          Patient Phone Number: [phone] (home)     Last appt: 4/15/2025   Next appt: 10/21/2025    Last OARRS:        No data to display              PDMP Monitoring:    Last PDMP Aidan as Reviewed ([place]):  Review User Review Instant Review Result          Preferred Pharmacy:   [facility] - [address] - [facility] [phone] - F [phone]  [address] [place] [address]  Phone: [phone] Fax: [phone]    [id number] [place], [place] - [address] - P [phone] - F [phone]  [address]  [facility] [address]  Phone: [phone] Fax: [phone]

## 2025-05-12 RX ORDER — SUMATRIPTAN 50 MG/1
50 TABLET, FILM COATED ORAL DAILY PRN
Qty: 12 TABLET | Refills: 5 | Status: SHIPPED | OUTPATIENT
Start: 2025-05-12

## 2025-05-12 NOTE — TELEPHONE ENCOUNTER
Medication:   Requested Prescriptions      No prescriptions requested or ordered in this encounter          Patient Phone Number: [phone] (home)     Last appt: 4/15/2025   Next appt: 10/21/2025    Last OARRS:        No data to display              PDMP Monitoring:    Last PDMP Aidan as Reviewed ([place]):  Review User Review Instant Review Result          Preferred Pharmacy:   [facility] - [address] - [facility] [phone] - F [phone]  [address] [place] [address]  Phone: [phone] Fax: [phone]    [id number] - [place], [place] - [address] - P [phone] - F [phone]  [address]  [facility] [address]  Phone: [phone] Fax: [phone]

## 2025-05-29 RX ORDER — FLUTICASONE PROPIONATE 50 MCG
2 SPRAY, SUSPENSION (ML) NASAL DAILY
Qty: 3 EACH | Refills: 3 | Status: SHIPPED | OUTPATIENT
Start: 2025-05-29